# Patient Record
Sex: MALE | Race: WHITE | URBAN - METROPOLITAN AREA
[De-identification: names, ages, dates, MRNs, and addresses within clinical notes are randomized per-mention and may not be internally consistent; named-entity substitution may affect disease eponyms.]

---

## 2021-06-14 ENCOUNTER — APPOINTMENT (OUTPATIENT)
Dept: RADIOLOGY | Facility: MEDICAL CENTER | Age: 81
DRG: 871 | End: 2021-06-14
Attending: STUDENT IN AN ORGANIZED HEALTH CARE EDUCATION/TRAINING PROGRAM
Payer: MEDICARE

## 2021-06-14 ENCOUNTER — APPOINTMENT (OUTPATIENT)
Dept: RADIOLOGY | Facility: MEDICAL CENTER | Age: 81
DRG: 871 | End: 2021-06-14
Attending: EMERGENCY MEDICINE
Payer: MEDICARE

## 2021-06-14 ENCOUNTER — HOSPITAL ENCOUNTER (INPATIENT)
Facility: MEDICAL CENTER | Age: 81
LOS: 8 days | DRG: 871 | End: 2021-06-22
Attending: EMERGENCY MEDICINE | Admitting: STUDENT IN AN ORGANIZED HEALTH CARE EDUCATION/TRAINING PROGRAM
Payer: MEDICARE

## 2021-06-14 ENCOUNTER — HOSPITAL ENCOUNTER (OUTPATIENT)
Dept: RADIOLOGY | Facility: MEDICAL CENTER | Age: 81
End: 2021-06-14
Payer: MEDICARE

## 2021-06-14 DIAGNOSIS — N20.0 KIDNEY STONE: ICD-10-CM

## 2021-06-14 DIAGNOSIS — A41.9 SEPSIS WITH ACUTE ORGAN DYSFUNCTION, DUE TO UNSPECIFIED ORGANISM, UNSPECIFIED TYPE, UNSPECIFIED WHETHER SEPTIC SHOCK PRESENT: ICD-10-CM

## 2021-06-14 DIAGNOSIS — R65.20 SEPSIS WITH ACUTE ORGAN DYSFUNCTION, DUE TO UNSPECIFIED ORGANISM, UNSPECIFIED TYPE, UNSPECIFIED WHETHER SEPTIC SHOCK PRESENT: ICD-10-CM

## 2021-06-14 PROBLEM — N12 PYELONEPHRITIS: Status: ACTIVE | Noted: 2021-06-14

## 2021-06-14 PROBLEM — Z66 DNR (DO NOT RESUSCITATE): Status: ACTIVE | Noted: 2021-06-14

## 2021-06-14 PROBLEM — E87.20 LACTIC ACIDOSIS: Status: ACTIVE | Noted: 2021-06-14

## 2021-06-14 PROBLEM — N17.9 ACUTE RENAL FAILURE (ARF) (HCC): Status: ACTIVE | Noted: 2021-06-14

## 2021-06-14 LAB
ALBUMIN SERPL BCP-MCNC: 2.8 G/DL (ref 3.2–4.9)
ALBUMIN/GLOB SERPL: 1 G/DL
ALP SERPL-CCNC: 68 U/L (ref 30–99)
ALT SERPL-CCNC: 28 U/L (ref 2–50)
ANION GAP SERPL CALC-SCNC: 14 MMOL/L (ref 7–16)
APPEARANCE UR: CLEAR
AST SERPL-CCNC: 43 U/L (ref 12–45)
BACTERIA #/AREA URNS HPF: NEGATIVE /HPF
BASOPHILS # BLD AUTO: 0 % (ref 0–1.8)
BASOPHILS # BLD: 0 K/UL (ref 0–0.12)
BILIRUB SERPL-MCNC: 0.4 MG/DL (ref 0.1–1.5)
BILIRUB UR QL STRIP.AUTO: NEGATIVE
BUN SERPL-MCNC: 39 MG/DL (ref 8–22)
CALCIUM SERPL-MCNC: 8.2 MG/DL (ref 8.5–10.5)
CHLORIDE SERPL-SCNC: 106 MMOL/L (ref 96–112)
CHLORIDE UR-SCNC: 88 MMOL/L
CO2 SERPL-SCNC: 17 MMOL/L (ref 20–33)
COLOR UR: YELLOW
CREAT SERPL-MCNC: 2.45 MG/DL (ref 0.5–1.4)
CREAT UR-MCNC: 72.42 MG/DL
EKG IMPRESSION: NORMAL
EOSINOPHIL # BLD AUTO: 0 K/UL (ref 0–0.51)
EOSINOPHIL NFR BLD: 0 % (ref 0–6.9)
EPI CELLS #/AREA URNS HPF: NEGATIVE /HPF
ERYTHROCYTE [DISTWIDTH] IN BLOOD BY AUTOMATED COUNT: 44.3 FL (ref 35.9–50)
GLOBULIN SER CALC-MCNC: 2.7 G/DL (ref 1.9–3.5)
GLUCOSE SERPL-MCNC: 123 MG/DL (ref 65–99)
GLUCOSE UR STRIP.AUTO-MCNC: NEGATIVE MG/DL
GRAM STN SPEC: NORMAL
HCT VFR BLD AUTO: 37.4 % (ref 42–52)
HGB BLD-MCNC: 12.6 G/DL (ref 14–18)
HYALINE CASTS #/AREA URNS LPF: ABNORMAL /LPF
INR PPP: 1.45 (ref 0.87–1.13)
KETONES UR STRIP.AUTO-MCNC: NEGATIVE MG/DL
LACTATE BLD-SCNC: 1.8 MMOL/L (ref 0.5–2)
LACTATE BLD-SCNC: 3.2 MMOL/L (ref 0.5–2)
LACTATE BLD-SCNC: 4 MMOL/L (ref 0.5–2)
LACTATE BLD-SCNC: 4.3 MMOL/L (ref 0.5–2)
LACTATE BLD-SCNC: 4.3 MMOL/L (ref 0.5–2)
LACTATE BLD-SCNC: 4.5 MMOL/L (ref 0.5–2)
LEUKOCYTE ESTERASE UR QL STRIP.AUTO: ABNORMAL
LYMPHOCYTES # BLD AUTO: 0.15 K/UL (ref 1–4.8)
LYMPHOCYTES NFR BLD: 0.9 % (ref 22–41)
MAGNESIUM SERPL-MCNC: 1.6 MG/DL (ref 1.5–2.5)
MANUAL DIFF BLD: ABNORMAL
MCH RBC QN AUTO: 32.8 PG (ref 27–33)
MCHC RBC AUTO-ENTMCNC: 33.7 G/DL (ref 33.7–35.3)
MCV RBC AUTO: 97.4 FL (ref 81.4–97.8)
MICRO URNS: ABNORMAL
MONOCYTES # BLD AUTO: 0.29 K/UL (ref 0–0.85)
MONOCYTES NFR BLD AUTO: 1.7 % (ref 0–13.4)
MORPHOLOGY BLD-IMP: NORMAL
NEUTROPHILS # BLD AUTO: 16.66 K/UL (ref 1.82–7.42)
NEUTROPHILS NFR BLD: 74.8 % (ref 44–72)
NEUTS BAND NFR BLD MANUAL: 22.6 % (ref 0–10)
NITRITE UR QL STRIP.AUTO: POSITIVE
NRBC # BLD AUTO: 0 K/UL
NRBC BLD-RTO: 0 /100 WBC
NT-PROBNP SERPL IA-MCNC: 1676 PG/ML (ref 0–125)
PH UR STRIP.AUTO: 7.5 [PH] (ref 5–8)
PHOSPHATE SERPL-MCNC: 0.6 MG/DL (ref 2.5–4.5)
PLATELET # BLD AUTO: 179 K/UL (ref 164–446)
PLATELET BLD QL SMEAR: NORMAL
PMV BLD AUTO: 10.7 FL (ref 9–12.9)
POTASSIUM SERPL-SCNC: 3.7 MMOL/L (ref 3.6–5.5)
POTASSIUM UR-SCNC: 41 MMOL/L
PROCALCITONIN SERPL-MCNC: 135.07 NG/ML
PROT SERPL-MCNC: 5.5 G/DL (ref 6–8.2)
PROT UR QL STRIP: 30 MG/DL
PROT UR-MCNC: 44 MG/DL (ref 0–15)
PROTHROMBIN TIME: 17.2 SEC (ref 12–14.6)
RBC # BLD AUTO: 3.84 M/UL (ref 4.7–6.1)
RBC # URNS HPF: ABNORMAL /HPF
RBC BLD AUTO: NORMAL
RBC UR QL AUTO: ABNORMAL
RENAL EPI CELLS #/AREA URNS HPF: ABNORMAL /HPF
SARS-COV+SARS-COV-2 AG RESP QL IA.RAPID: NOTDETECTED
SARS-COV-2 RNA RESP QL NAA+PROBE: NOTDETECTED
SIGNIFICANT IND 70042: NORMAL
SITE SITE: NORMAL
SODIUM SERPL-SCNC: 137 MMOL/L (ref 135–145)
SODIUM UR-SCNC: 83 MMOL/L
SOURCE SOURCE: NORMAL
SP GR UR STRIP.AUTO: 1.01
SPECIMEN SOURCE: NORMAL
SPECIMEN SOURCE: NORMAL
UROBILINOGEN UR STRIP.AUTO-MCNC: 0.2 MG/DL
WBC # BLD AUTO: 17.1 K/UL (ref 4.8–10.8)
WBC #/AREA URNS HPF: ABNORMAL /HPF

## 2021-06-14 PROCEDURE — 700105 HCHG RX REV CODE 258: Performed by: EMERGENCY MEDICINE

## 2021-06-14 PROCEDURE — 99291 CRITICAL CARE FIRST HOUR: CPT | Performed by: STUDENT IN AN ORGANIZED HEALTH CARE EDUCATION/TRAINING PROGRAM

## 2021-06-14 PROCEDURE — 700111 HCHG RX REV CODE 636 W/ 250 OVERRIDE (IP): Performed by: EMERGENCY MEDICINE

## 2021-06-14 PROCEDURE — 87426 SARSCOV CORONAVIRUS AG IA: CPT

## 2021-06-14 PROCEDURE — 82570 ASSAY OF URINE CREATININE: CPT

## 2021-06-14 PROCEDURE — 99285 EMERGENCY DEPT VISIT HI MDM: CPT

## 2021-06-14 PROCEDURE — 87077 CULTURE AEROBIC IDENTIFY: CPT | Mod: 91

## 2021-06-14 PROCEDURE — 96374 THER/PROPH/DIAG INJ IV PUSH: CPT

## 2021-06-14 PROCEDURE — 76775 US EXAM ABDO BACK WALL LIM: CPT

## 2021-06-14 PROCEDURE — U0005 INFEC AGEN DETEC AMPLI PROBE: HCPCS

## 2021-06-14 PROCEDURE — 700102 HCHG RX REV CODE 250 W/ 637 OVERRIDE(OP): Performed by: STUDENT IN AN ORGANIZED HEALTH CARE EDUCATION/TRAINING PROGRAM

## 2021-06-14 PROCEDURE — 700111 HCHG RX REV CODE 636 W/ 250 OVERRIDE (IP): Performed by: STUDENT IN AN ORGANIZED HEALTH CARE EDUCATION/TRAINING PROGRAM

## 2021-06-14 PROCEDURE — 700105 HCHG RX REV CODE 258: Performed by: STUDENT IN AN ORGANIZED HEALTH CARE EDUCATION/TRAINING PROGRAM

## 2021-06-14 PROCEDURE — A9270 NON-COVERED ITEM OR SERVICE: HCPCS | Performed by: STUDENT IN AN ORGANIZED HEALTH CARE EDUCATION/TRAINING PROGRAM

## 2021-06-14 PROCEDURE — 87150 DNA/RNA AMPLIFIED PROBE: CPT | Mod: 91

## 2021-06-14 PROCEDURE — 36415 COLL VENOUS BLD VENIPUNCTURE: CPT

## 2021-06-14 PROCEDURE — C1729 CATH, DRAINAGE: HCPCS

## 2021-06-14 PROCEDURE — 83735 ASSAY OF MAGNESIUM: CPT

## 2021-06-14 PROCEDURE — 87086 URINE CULTURE/COLONY COUNT: CPT

## 2021-06-14 PROCEDURE — 81001 URINALYSIS AUTO W/SCOPE: CPT

## 2021-06-14 PROCEDURE — 83880 ASSAY OF NATRIURETIC PEPTIDE: CPT

## 2021-06-14 PROCEDURE — 97161 PT EVAL LOW COMPLEX 20 MIN: CPT

## 2021-06-14 PROCEDURE — 93005 ELECTROCARDIOGRAM TRACING: CPT | Performed by: STUDENT IN AN ORGANIZED HEALTH CARE EDUCATION/TRAINING PROGRAM

## 2021-06-14 PROCEDURE — C9803 HOPD COVID-19 SPEC COLLECT: HCPCS | Performed by: EMERGENCY MEDICINE

## 2021-06-14 PROCEDURE — 700111 HCHG RX REV CODE 636 W/ 250 OVERRIDE (IP)

## 2021-06-14 PROCEDURE — 770020 HCHG ROOM/CARE - TELE (206)

## 2021-06-14 PROCEDURE — 87186 SC STD MICRODIL/AGAR DIL: CPT | Mod: 91

## 2021-06-14 PROCEDURE — 99153 MOD SED SAME PHYS/QHP EA: CPT

## 2021-06-14 PROCEDURE — 84145 PROCALCITONIN (PCT): CPT

## 2021-06-14 PROCEDURE — 84156 ASSAY OF PROTEIN URINE: CPT

## 2021-06-14 PROCEDURE — 700117 HCHG RX CONTRAST REV CODE 255: Performed by: EMERGENCY MEDICINE

## 2021-06-14 PROCEDURE — 84133 ASSAY OF URINE POTASSIUM: CPT

## 2021-06-14 PROCEDURE — 93010 ELECTROCARDIOGRAM REPORT: CPT | Performed by: INTERNAL MEDICINE

## 2021-06-14 PROCEDURE — U0003 INFECTIOUS AGENT DETECTION BY NUCLEIC ACID (DNA OR RNA); SEVERE ACUTE RESPIRATORY SYNDROME CORONAVIRUS 2 (SARS-COV-2) (CORONAVIRUS DISEASE [COVID-19]), AMPLIFIED PROBE TECHNIQUE, MAKING USE OF HIGH THROUGHPUT TECHNOLOGIES AS DESCRIBED BY CMS-2020-01-R: HCPCS

## 2021-06-14 PROCEDURE — 83605 ASSAY OF LACTIC ACID: CPT | Mod: 91

## 2021-06-14 PROCEDURE — 82436 ASSAY OF URINE CHLORIDE: CPT

## 2021-06-14 PROCEDURE — 85027 COMPLETE CBC AUTOMATED: CPT

## 2021-06-14 PROCEDURE — BT111ZZ FLUOROSCOPY OF RIGHT KIDNEY USING LOW OSMOLAR CONTRAST: ICD-10-PCS | Performed by: RADIOLOGY

## 2021-06-14 PROCEDURE — 71045 X-RAY EXAM CHEST 1 VIEW: CPT

## 2021-06-14 PROCEDURE — 87070 CULTURE OTHR SPECIMN AEROBIC: CPT

## 2021-06-14 PROCEDURE — 96375 TX/PRO/DX INJ NEW DRUG ADDON: CPT

## 2021-06-14 PROCEDURE — 80053 COMPREHEN METABOLIC PANEL: CPT

## 2021-06-14 PROCEDURE — 84300 ASSAY OF URINE SODIUM: CPT

## 2021-06-14 PROCEDURE — 87205 SMEAR GRAM STAIN: CPT

## 2021-06-14 PROCEDURE — 84100 ASSAY OF PHOSPHORUS: CPT

## 2021-06-14 PROCEDURE — 87040 BLOOD CULTURE FOR BACTERIA: CPT

## 2021-06-14 PROCEDURE — 85007 BL SMEAR W/DIFF WBC COUNT: CPT

## 2021-06-14 PROCEDURE — 85610 PROTHROMBIN TIME: CPT

## 2021-06-14 PROCEDURE — 0T9330Z DRAINAGE OF RIGHT KIDNEY PELVIS WITH DRAINAGE DEVICE, PERCUTANEOUS APPROACH: ICD-10-PCS | Performed by: RADIOLOGY

## 2021-06-14 RX ORDER — AMOXICILLIN 250 MG
2 CAPSULE ORAL 2 TIMES DAILY
Status: DISCONTINUED | OUTPATIENT
Start: 2021-06-14 | End: 2021-06-22 | Stop reason: HOSPADM

## 2021-06-14 RX ORDER — ONDANSETRON 2 MG/ML
4 INJECTION INTRAMUSCULAR; INTRAVENOUS PRN
Status: ACTIVE | OUTPATIENT
Start: 2021-06-14 | End: 2021-06-14

## 2021-06-14 RX ORDER — MIDAZOLAM HYDROCHLORIDE 1 MG/ML
INJECTION INTRAMUSCULAR; INTRAVENOUS
Status: COMPLETED
Start: 2021-06-14 | End: 2021-06-14

## 2021-06-14 RX ORDER — AZITHROMYCIN 250 MG/1
500 TABLET, FILM COATED ORAL DAILY
Status: DISCONTINUED | OUTPATIENT
Start: 2021-06-14 | End: 2021-06-14

## 2021-06-14 RX ORDER — SODIUM CHLORIDE, SODIUM LACTATE, POTASSIUM CHLORIDE, AND CALCIUM CHLORIDE .6; .31; .03; .02 G/100ML; G/100ML; G/100ML; G/100ML
500 INJECTION, SOLUTION INTRAVENOUS
Status: DISCONTINUED | OUTPATIENT
Start: 2021-06-14 | End: 2021-06-22 | Stop reason: HOSPADM

## 2021-06-14 RX ORDER — MAGNESIUM SULFATE HEPTAHYDRATE 40 MG/ML
2 INJECTION, SOLUTION INTRAVENOUS ONCE
Status: COMPLETED | OUTPATIENT
Start: 2021-06-14 | End: 2021-06-14

## 2021-06-14 RX ORDER — MORPHINE SULFATE 4 MG/ML
4 INJECTION, SOLUTION INTRAMUSCULAR; INTRAVENOUS EVERY 4 HOURS PRN
Status: DISCONTINUED | OUTPATIENT
Start: 2021-06-14 | End: 2021-06-14

## 2021-06-14 RX ORDER — SODIUM CHLORIDE 9 MG/ML
500 INJECTION, SOLUTION INTRAVENOUS
Status: ACTIVE | OUTPATIENT
Start: 2021-06-14 | End: 2021-06-14

## 2021-06-14 RX ORDER — SODIUM CHLORIDE, SODIUM LACTATE, POTASSIUM CHLORIDE, CALCIUM CHLORIDE 600; 310; 30; 20 MG/100ML; MG/100ML; MG/100ML; MG/100ML
INJECTION, SOLUTION INTRAVENOUS CONTINUOUS
Status: DISCONTINUED | OUTPATIENT
Start: 2021-06-14 | End: 2021-06-14

## 2021-06-14 RX ORDER — ACETAMINOPHEN 325 MG/1
650 TABLET ORAL EVERY 6 HOURS PRN
Status: DISCONTINUED | OUTPATIENT
Start: 2021-06-14 | End: 2021-06-14

## 2021-06-14 RX ORDER — ENALAPRILAT 1.25 MG/ML
1.25 INJECTION INTRAVENOUS EVERY 6 HOURS PRN
Status: DISCONTINUED | OUTPATIENT
Start: 2021-06-14 | End: 2021-06-22 | Stop reason: HOSPADM

## 2021-06-14 RX ORDER — AZITHROMYCIN 500 MG/5ML
500 INJECTION, POWDER, LYOPHILIZED, FOR SOLUTION INTRAVENOUS EVERY 24 HOURS
Status: DISCONTINUED | OUTPATIENT
Start: 2021-06-14 | End: 2021-06-14

## 2021-06-14 RX ORDER — SODIUM CHLORIDE, SODIUM LACTATE, POTASSIUM CHLORIDE, AND CALCIUM CHLORIDE .6; .31; .03; .02 G/100ML; G/100ML; G/100ML; G/100ML
1000 INJECTION, SOLUTION INTRAVENOUS ONCE
Status: COMPLETED | OUTPATIENT
Start: 2021-06-14 | End: 2021-06-14

## 2021-06-14 RX ORDER — BISACODYL 10 MG
10 SUPPOSITORY, RECTAL RECTAL
Status: DISCONTINUED | OUTPATIENT
Start: 2021-06-14 | End: 2021-06-22 | Stop reason: HOSPADM

## 2021-06-14 RX ORDER — MIDODRINE HYDROCHLORIDE 5 MG/1
5 TABLET ORAL
Status: DISCONTINUED | OUTPATIENT
Start: 2021-06-14 | End: 2021-06-18

## 2021-06-14 RX ORDER — ONDANSETRON 4 MG/1
4 TABLET, ORALLY DISINTEGRATING ORAL EVERY 4 HOURS PRN
Status: DISCONTINUED | OUTPATIENT
Start: 2021-06-14 | End: 2021-06-22 | Stop reason: HOSPADM

## 2021-06-14 RX ORDER — POTASSIUM CHLORIDE 20 MEQ/1
40 TABLET, EXTENDED RELEASE ORAL ONCE
Status: COMPLETED | OUTPATIENT
Start: 2021-06-14 | End: 2021-06-14

## 2021-06-14 RX ORDER — LABETALOL HYDROCHLORIDE 5 MG/ML
10 INJECTION, SOLUTION INTRAVENOUS EVERY 4 HOURS PRN
Status: DISCONTINUED | OUTPATIENT
Start: 2021-06-14 | End: 2021-06-22 | Stop reason: HOSPADM

## 2021-06-14 RX ORDER — POLYETHYLENE GLYCOL 3350 17 G/17G
1 POWDER, FOR SOLUTION ORAL
Status: DISCONTINUED | OUTPATIENT
Start: 2021-06-14 | End: 2021-06-22 | Stop reason: HOSPADM

## 2021-06-14 RX ORDER — ACETAMINOPHEN 500 MG
1000 TABLET ORAL 3 TIMES DAILY
Status: DISCONTINUED | OUTPATIENT
Start: 2021-06-14 | End: 2021-06-22 | Stop reason: HOSPADM

## 2021-06-14 RX ORDER — SODIUM CHLORIDE, SODIUM LACTATE, POTASSIUM CHLORIDE, AND CALCIUM CHLORIDE .6; .31; .03; .02 G/100ML; G/100ML; G/100ML; G/100ML
30 INJECTION, SOLUTION INTRAVENOUS ONCE
Status: COMPLETED | OUTPATIENT
Start: 2021-06-14 | End: 2021-06-14

## 2021-06-14 RX ORDER — SODIUM CHLORIDE, SODIUM LACTATE, POTASSIUM CHLORIDE, CALCIUM CHLORIDE 600; 310; 30; 20 MG/100ML; MG/100ML; MG/100ML; MG/100ML
INJECTION, SOLUTION INTRAVENOUS CONTINUOUS
Status: DISCONTINUED | OUTPATIENT
Start: 2021-06-14 | End: 2021-06-22 | Stop reason: HOSPADM

## 2021-06-14 RX ORDER — MIDAZOLAM HYDROCHLORIDE 1 MG/ML
.5-2 INJECTION INTRAMUSCULAR; INTRAVENOUS PRN
Status: ACTIVE | OUTPATIENT
Start: 2021-06-14 | End: 2021-06-14

## 2021-06-14 RX ORDER — ONDANSETRON 2 MG/ML
4 INJECTION INTRAMUSCULAR; INTRAVENOUS EVERY 4 HOURS PRN
Status: DISCONTINUED | OUTPATIENT
Start: 2021-06-14 | End: 2021-06-22 | Stop reason: HOSPADM

## 2021-06-14 RX ADMIN — MIDAZOLAM HYDROCHLORIDE 0.5 MG: 1 INJECTION INTRAMUSCULAR; INTRAVENOUS at 05:32

## 2021-06-14 RX ADMIN — SODIUM CHLORIDE, POTASSIUM CHLORIDE, SODIUM LACTATE AND CALCIUM CHLORIDE 1000 ML: 600; 310; 30; 20 INJECTION, SOLUTION INTRAVENOUS at 03:56

## 2021-06-14 RX ADMIN — POTASSIUM CHLORIDE 40 MEQ: 1500 TABLET, EXTENDED RELEASE ORAL at 06:23

## 2021-06-14 RX ADMIN — MIDODRINE HYDROCHLORIDE 5 MG: 5 TABLET ORAL at 17:42

## 2021-06-14 RX ADMIN — SODIUM CHLORIDE, POTASSIUM CHLORIDE, SODIUM LACTATE AND CALCIUM CHLORIDE 1000 ML: 600; 310; 30; 20 INJECTION, SOLUTION INTRAVENOUS at 04:54

## 2021-06-14 RX ADMIN — MAGNESIUM SULFATE 2 G: 2 INJECTION INTRAVENOUS at 06:23

## 2021-06-14 RX ADMIN — IOHEXOL 15 ML: 300 INJECTION, SOLUTION INTRAVENOUS at 05:53

## 2021-06-14 RX ADMIN — MIDODRINE HYDROCHLORIDE 5 MG: 5 TABLET ORAL at 11:33

## 2021-06-14 RX ADMIN — SODIUM CHLORIDE, POTASSIUM CHLORIDE, SODIUM LACTATE AND CALCIUM CHLORIDE: 600; 310; 30; 20 INJECTION, SOLUTION INTRAVENOUS at 06:15

## 2021-06-14 RX ADMIN — CEFTRIAXONE SODIUM 1 G: 10 INJECTION, POWDER, FOR SOLUTION INTRAVENOUS at 03:56

## 2021-06-14 RX ADMIN — SODIUM CHLORIDE, POTASSIUM CHLORIDE, SODIUM LACTATE AND CALCIUM CHLORIDE 1000 ML: 600; 310; 30; 20 INJECTION, SOLUTION INTRAVENOUS at 11:32

## 2021-06-14 RX ADMIN — SODIUM CHLORIDE, POTASSIUM CHLORIDE, SODIUM LACTATE AND CALCIUM CHLORIDE: 600; 310; 30; 20 INJECTION, SOLUTION INTRAVENOUS at 15:43

## 2021-06-14 RX ADMIN — MIDAZOLAM HYDROCHLORIDE 0.5 MG: 1 INJECTION, SOLUTION INTRAMUSCULAR; INTRAVENOUS at 05:32

## 2021-06-14 RX ADMIN — AZITHROMYCIN MONOHYDRATE 500 MG: 250 TABLET ORAL at 09:35

## 2021-06-14 ASSESSMENT — ENCOUNTER SYMPTOMS
CONSTIPATION: 0
EYES NEGATIVE: 1
FALLS: 0
INSOMNIA: 0
SHORTNESS OF BREATH: 0
EYE PAIN: 0
COUGH: 0
ABDOMINAL PAIN: 0
GASTROINTESTINAL NEGATIVE: 1
LOSS OF CONSCIOUSNESS: 0
FEVER: 0
BLURRED VISION: 0
WEAKNESS: 1
RESPIRATORY NEGATIVE: 1
CARDIOVASCULAR NEGATIVE: 1
CHILLS: 1
DIARRHEA: 0
NAUSEA: 1
SENSORY CHANGE: 0
PALPITATIONS: 0
VOMITING: 0
HEADACHES: 0
DIZZINESS: 1
FOCAL WEAKNESS: 0
FEVER: 1
BACK PAIN: 1
DIZZINESS: 0
PSYCHIATRIC NEGATIVE: 1
FLANK PAIN: 1

## 2021-06-14 ASSESSMENT — PATIENT HEALTH QUESTIONNAIRE - PHQ9
SUM OF ALL RESPONSES TO PHQ9 QUESTIONS 1 AND 2: 0
2. FEELING DOWN, DEPRESSED, IRRITABLE, OR HOPELESS: NOT AT ALL
1. LITTLE INTEREST OR PLEASURE IN DOING THINGS: NOT AT ALL

## 2021-06-14 ASSESSMENT — COGNITIVE AND FUNCTIONAL STATUS - GENERAL
MOVING FROM LYING ON BACK TO SITTING ON SIDE OF FLAT BED: A LITTLE
MOBILITY SCORE: 22
SUGGESTED CMS G CODE MODIFIER MOBILITY: CJ
MOBILITY SCORE: 21
SUGGESTED CMS G CODE MODIFIER MOBILITY: CJ
TURNING FROM BACK TO SIDE WHILE IN FLAT BAD: A LITTLE
TURNING FROM BACK TO SIDE WHILE IN FLAT BAD: A LITTLE
MOVING TO AND FROM BED TO CHAIR: A LITTLE
SUGGESTED CMS G CODE MODIFIER DAILY ACTIVITY: CH
DAILY ACTIVITIY SCORE: 24
CLIMB 3 TO 5 STEPS WITH RAILING: A LITTLE

## 2021-06-14 ASSESSMENT — GAIT ASSESSMENTS
ASSISTIVE DEVICE: FRONT WHEEL WALKER
DISTANCE (FEET): 75
GAIT LEVEL OF ASSIST: SUPERVISED

## 2021-06-14 ASSESSMENT — LIFESTYLE VARIABLES
TOTAL SCORE: 0
SUBSTANCE_ABUSE: 0
HAVE YOU EVER FELT YOU SHOULD CUT DOWN ON YOUR DRINKING: NO
TOTAL SCORE: 0
EVER FELT BAD OR GUILTY ABOUT YOUR DRINKING: NO
EVER HAD A DRINK FIRST THING IN THE MORNING TO STEADY YOUR NERVES TO GET RID OF A HANGOVER: NO
ALCOHOL_USE: NO
CONSUMPTION TOTAL: INCOMPLETE
HAVE PEOPLE ANNOYED YOU BY CRITICIZING YOUR DRINKING: NO
TOTAL SCORE: 0

## 2021-06-14 ASSESSMENT — FIBROSIS 4 INDEX
FIB4 SCORE: 3.68
FIB4 SCORE: 3.68

## 2021-06-14 NOTE — CARE PLAN
The patient is Watcher - Medium risk of patient condition declining or worsening         Progress made toward(s) clinical / shift goals:  monitoring labs and vs     Patient is not progressing towards the following goals:

## 2021-06-14 NOTE — PROGRESS NOTES
Received phone call from [facility] regarding a positive results on blood cultures. Will fax it to [facility] as soon as it comes in.  Bedside RN notified.

## 2021-06-14 NOTE — CONSULTS
UROLOGY Consult Note:    [facility]  Date & Time note created:    6/14/2021   12:16 PM     Referring MD:  Dr. Gary Orellana    Patient ID:   Name:             Alex Odell     YOB: 1940  Age:                 81 y.o.  male   MRN:               [id number]                                                             Reason for Consult:      Infected stone    History of Present Illness:    81 y.o. male who presents to the Emergency Department transferred from outside hospital with infected ureteral stone. Patient had been having some increasing right-sided flank pain over the last couple days.  He also reports that he is had intermittent chills but denies fever.  Denies urinary frequency, urgency or hematuria.   At the outside hospital he was found to have nitrite positive esterase positive urine with multiple white blood cells he was also found to have a lactic acidosis at 3.8.  CT scan at outside hospital showed a 3 mm right-sided UVJ stone with some obstructive uropathy.    Review of Systems:      Constitutional: Denies fevers, Denies weight changes  Eyes: Denies changes in vision, no eye pain  Ears/Nose/Throat/Mouth: Denies nasal congestion or sore throat   Cardiovascular: no chest pain, no palpitations   Respiratory: no shortness of breath , Denies cough  Gastrointestinal/Hepatic: Positive for abdominal pain.  Denies nausea, vomiting, diarrhea, constipation or GI bleeding   Genitourinary: Denies hematuria, dysuria or frequency  Musculoskeletal/Rheum: Denies  joint pain and swelling, no edema  Skin: Denies rash  Neurological: Denies headache, confusion, memory loss or focal weakness/parasthesias  Psychiatric: denies mood disorder   Endocrine: Kaur thyroid problems  Heme/Oncology/Lymph Nodes: Denies enlarged lymph nodes, denies brusing or known bleeding disorder  All other systems were reviewed and are negative (AMA/CMS criteria)                Past Medical History:   No  past medical history on file.  Active Hospital Problems    Diagnosis    • Sepsis (HCC) [A41.9]    • Acute renal failure (ARF) (HCC) [N17.9]    • Pyelonephritis [N12]    • Lactic acidosis [E87.2]    • DNR (do not resuscitate) [Z66]        Past Surgical History:  Past Surgical History:   Procedure Laterality Date   • OTHER      90%prostate removed        Hospital Medications:    Current Facility-Administered Medications:   •  [START ON 6/15/2021] cefTRIAXone (Rocephin) syringe 1 g, 1 g, Intravenous, Q24HRS, Gary Orellana M.D.  •  lactated ringers infusion (BOLUS), 500 mL, Intravenous, Once PRN, Gary Orellana M.D.  •  senna-docusate (PERICOLACE or SENOKOT S) 8.6-50 MG per tablet 2 tablet, 2 tablet, Oral, BID **AND** polyethylene glycol/lytes (MIRALAX) PACKET 1 Packet, 1 Packet, Oral, QDAY PRN **AND** magnesium hydroxide (MILK OF MAGNESIA) suspension 30 mL, 30 mL, Oral, QDAY PRN **AND** bisacodyl (DULCOLAX) suppository 10 mg, 10 mg, Rectal, QDAY PRN, Gary Orellana M.D.  •  enalaprilat (VASOTEC) injection 1.25 mg, 1.25 mg, Intravenous, Q6HRS PRN, Gary Orellana M.D.  •  labetalol (NORMODYNE/TRANDATE) injection 10 mg, 10 mg, Intravenous, Q4HRS PRN, Gary Orellana M.D.  •  ondansetron (ZOFRAN) syringe/vial injection 4 mg, 4 mg, Intravenous, Q4HRS PRN, Gary Orellana M.D.  •  ondansetron (ZOFRAN ODT) dispertab 4 mg, 4 mg, Oral, Q4HRS PRN, Gary Orellana M.D.  •  FENTANYL CITRATE (PF) 0.05 MG/ML INJ SOLN (WRAPPED), , , ,   •  lactated ringers infusion, , Intravenous, Continuous, Gary Orellana M.D., Last Rate: 100 mL/hr at 06/14/21 0615, New Bag at 06/14/21 0615  •  acetaminophen (TYLENOL) tablet 1,000 mg, 1,000 mg, Oral, TID, Gary Orellana M.D.  •  midodrine (PROAMATINE) tablet 5 mg, 5 mg, Oral, TID WITH MEALS, Jimenez Muniz M.D., 5 mg at 06/14/21 1133    Current Outpatient Medications:  No medications prior to admission.  "      Medication Allergy:  No Known Allergies    Family History:  Family History   Problem Relation Age of Onset   • Cancer Mother        Social History:  Social History     Socioeconomic History   • Marital status: Unknown     Spouse name: Not on file   • Number of children: Not on file   • Years of education: Not on file   • Highest education level: Not on file   Occupational History   • Not on file   Tobacco Use   • Smoking status: Never Smoker   • Smokeless tobacco: Never Used   Vaping Use   • Vaping Use: Never used   Substance and Sexual Activity   • Alcohol use: Never   • Drug use: Never   • Sexual activity: Not on file   Other Topics Concern   • Not on file   Social History Narrative   • Not on file     Social Determinants of Health     Financial Resource Strain:    • Difficulty of Paying Living Expenses:    Food Insecurity:    • Worried About Running Out of Food in the Last Year:    • Ran Out of Food in the Last Year:    Transportation Needs:    • Lack of Transportation (Medical):    • Lack of Transportation (Non-Medical):    Physical Activity:    • Days of Exercise per Week:    • Minutes of Exercise per Session:    Stress:    • Feeling of Stress :    Social Connections:    • Frequency of Communication with Friends and Family:    • Frequency of Social Gatherings with Friends and Family:    • Attends [employer] Services:    • Active Member of Clubs or Organizations:    • Attends Club or Organization Meetings:    • Marital Status:    Intimate Partner Violence:    • Fear of Current or Ex-Partner:    • Emotionally Abused:    • Physically Abused:    • Sexually Abused:          Physical Exam:  Vitals/ General Appearance:   Weight/BMI: Body mass index is 20.92 kg/m².  BP (!) 87/56   Pulse 90   Temp 37.1 °C (98.8 °F) (Temporal)   Resp 17   Ht 1.803 m (5' 11\")   Wt 68 kg (150 lb)   SpO2 95%   Vitals:    06/14/21 0723 06/14/21 0727 06/14/21 0823 06/14/21 0922   BP:  (!) 93/56 (!) 87/56    Pulse: 90  90    Resp: " "17  17    Temp: 37.1 °C (98.8 °F)      TempSrc: Temporal      SpO2: 94%  95%    Weight:   68 kg (150 lb) 68 kg (150 lb)   Height:   1.803 m (5' 11\")      Oxygen Therapy:  Pulse Oximetry: 95 %, O2 (LPM): 2, O2 Delivery Device: Silicone Nasal Cannula    Constitutional:   Well developed, Well nourished, No acute distress  HENMT:  Normocephalic, Atraumatic  Eyes:  EOMI, Conjunctiva normal, No discharge.  Neck:  Normal range of motion  Lungs:  Good air entry, no labored breathing.   Abdomen: soft, no distention.  : RT NT in place draining tea colored urine.  Bashir in place   Skin: Warm, Dry, No erythema, No rash, no induration.  Neurologic: Alert & oriented x 3.  Psychiatric: Affect normal, Judgment normal, Mood normal.      MDM (Data Review):     Records reviewed and summarized in current documentation    Lab Data Review:  Recent Results (from the past 24 hour(s))   LACTIC ACID    Collection Time: 06/14/21  3:00 AM   Result Value Ref Range    Lactic Acid 4.3 (HH) 0.5 - 2.0 mmol/L   CBC WITH DIFFERENTIAL    Collection Time: 06/14/21  3:00 AM   Result Value Ref Range    WBC 17.1 (H) 4.8 - 10.8 K/uL    RBC 3.84 (L) 4.70 - 6.10 M/uL    Hemoglobin 12.6 (L) 14.0 - 18.0 g/dL    Hematocrit 37.4 (L) 42.0 - 52.0 %    MCV 97.4 81.4 - 97.8 fL    MCH 32.8 27.0 - 33.0 pg    MCHC 33.7 33.7 - 35.3 g/dL    RDW 44.3 35.9 - 50.0 fL    Platelet Count 179 164 - 446 K/uL    MPV 10.7 9.0 - 12.9 fL    Neutrophils-Polys 74.80 (H) 44.00 - 72.00 %    Lymphocytes 0.90 (L) 22.00 - 41.00 %    Monocytes 1.70 0.00 - 13.40 %    Eosinophils 0.00 0.00 - 6.90 %    Basophils 0.00 0.00 - 1.80 %    Nucleated RBC 0.00 /100 WBC    Neutrophils (Absolute) 16.66 (H) 1.82 - 7.42 K/uL    Lymphs (Absolute) 0.15 (L) 1.00 - 4.80 K/uL    Monos (Absolute) 0.29 0.00 - 0.85 K/uL    Eos (Absolute) 0.00 0.00 - 0.51 K/uL    Baso (Absolute) 0.00 0.00 - 0.12 K/uL    NRBC (Absolute) 0.00 K/uL   DIFFERENTIAL MANUAL    Collection Time: 06/14/21  3:00 AM   Result Value Ref Range "    Bands-Stabs 22.60 (H) 0.00 - 10.00 %    Manual Diff Status PERFORMED    PERIPHERAL SMEAR REVIEW    Collection Time: 06/14/21  3:00 AM   Result Value Ref Range    Peripheral Smear Review see below    PLATELET ESTIMATE    Collection Time: 06/14/21  3:00 AM   Result Value Ref Range    Plt Estimation Normal    MORPHOLOGY    Collection Time: 06/14/21  3:00 AM   Result Value Ref Range    RBC Morphology Normal    PHOSPHORUS    Collection Time: 06/14/21  3:00 AM   Result Value Ref Range    Phosphorus 0.6 (LL) 2.5 - 4.5 mg/dL   SARS-COV Antigen ARUN: Collect dry nasal swab AND NP swab in VTM    Collection Time: 06/14/21  4:00 AM   Result Value Ref Range    SARS-CoV-2 Source Nasal Swab     SARS-COV ANTIGEN ARUN NotDetected Not-Detected   SARS-CoV-2 PCR (24 hour In-House): Collect NP swab in VTM    Collection Time: 06/14/21  4:00 AM    Specimen: Respirate   Result Value Ref Range    SARS-CoV-2 Source NP Swab    LACTIC ACID    Collection Time: 06/14/21  4:45 AM   Result Value Ref Range    Lactic Acid 4.0 (HH) 0.5 - 2.0 mmol/L   URINALYSIS    Collection Time: 06/14/21  4:45 AM    Specimen: Urine   Result Value Ref Range    Color Yellow     Character Clear     Specific Gravity 1.013 <1.035    Ph 7.5 5.0 - 8.0    Glucose Negative Negative mg/dL    Ketones Negative Negative mg/dL    Protein 30 (A) Negative mg/dL    Bilirubin Negative Negative    Urobilinogen, Urine 0.2 Negative    Nitrite Positive (A) Negative    Leukocyte Esterase Trace (A) Negative    Occult Blood Moderate (A) Negative    Micro Urine Req Microscopic    Prothrombin Time    Collection Time: 06/14/21  4:45 AM   Result Value Ref Range    PT 17.2 (H) 12.0 - 14.6 sec    INR 1.45 (H) 0.87 - 1.13   proBrain Natriuretic Peptide, NT    Collection Time: 06/14/21  4:45 AM   Result Value Ref Range    NT-proBNP 1676 (H) 0 - 125 pg/mL   PROCALCITONIN    Collection Time: 06/14/21  4:45 AM   Result Value Ref Range    Procalcitonin 135.07 (H) <0.25 ng/mL   Comp Metabolic Panel     Collection Time: 06/14/21  4:45 AM   Result Value Ref Range    Sodium 137 135 - 145 mmol/L    Potassium 3.7 3.6 - 5.5 mmol/L    Chloride 106 96 - 112 mmol/L    Co2 17 (L) 20 - 33 mmol/L    Anion Gap 14.0 7.0 - 16.0    Glucose 123 (H) 65 - 99 mg/dL    Bun 39 (H) 8 - 22 mg/dL    Creatinine 2.45 (H) 0.50 - 1.40 mg/dL    Calcium 8.2 (L) 8.5 - 10.5 mg/dL    AST(SGOT) 43 12 - 45 U/L    ALT(SGPT) 28 2 - 50 U/L    Alkaline Phosphatase 68 30 - 99 U/L    Total Bilirubin 0.4 0.1 - 1.5 mg/dL    Albumin 2.8 (L) 3.2 - 4.9 g/dL    Total Protein 5.5 (L) 6.0 - 8.2 g/dL    Globulin 2.7 1.9 - 3.5 g/dL    A-G Ratio 1.0 g/dL   MAGNESIUM    Collection Time: 06/14/21  4:45 AM   Result Value Ref Range    Magnesium 1.6 1.5 - 2.5 mg/dL   URINE MICROSCOPIC (W/UA)    Collection Time: 06/14/21  4:45 AM   Result Value Ref Range    WBC 10-20 (A) /hpf    RBC 10-20 (A) /hpf    Bacteria Negative None /hpf    Epithelial Cells Negative /hpf    Epithelial Cells Renal Rare /hpf    Hyaline Cast 0-2 /lpf   ESTIMATED GFR    Collection Time: 06/14/21  4:45 AM   Result Value Ref Range    GFR If  31 (A) >60 mL/min/1.73 m 2    GFR If Non African American 25 (A) >60 mL/min/1.73 m 2   URINE SODIUM RANDOM    Collection Time: 06/14/21  6:27 AM   Result Value Ref Range    Sodium, Urine -per volume 83 mmol/L   URINE POTASSIUM RANDOM    Collection Time: 06/14/21  6:27 AM   Result Value Ref Range    Potassium 41.0 mmol/L   URINE CHLORIDE RANDOM    Collection Time: 06/14/21  6:27 AM   Result Value Ref Range    Chloride, Urine-per volume 88 mmol/L   URINE CREATININE RANDOM    Collection Time: 06/14/21  6:27 AM   Result Value Ref Range    Creatinine, Random Urine 72.42 mg/dL   URINE PROTEIN RANDOM    Collection Time: 06/14/21  6:27 AM   Result Value Ref Range    Total Protein, Urine 44.0 (H) 0.0 - 15.0 mg/dL   EKG    Collection Time: 06/14/21  6:45 AM   Result Value Ref Range    Report       [facility]    Test Date:  2021-06-14  Pt Name:     CHARANJIT SANDERS              Department: ER  MRN:        [id number]                      Room:       T827  Gender:     Male                         Technician: JOYA  :        [id number]                   Requested By:NI SUAZO  Order #:    [id number]                    Reading MD: Loy Ferrara MD    Measurements  Intervals                                Axis  Rate:       89                           P:          73  TN:         148                          QRS:        92  QRSD:       90                           T:          -10  QT:         384  QTc:        468    Interpretive Statements  SINUS RHYTHM  BORDERLINE RIGHT AXIS DEVIATION  BORDERLINE LOW VOLTAGE IN FRONTAL LEADS  BORDERLINE T ABNORMALITIES, INFERIOR LEADS  No previous ECG available for comparison  Electronically Signed On 2021 6:58:05 PDT by Loy Ferrara MD     LACTIC ACID    Collection Time: 21  8:22 AM   Result Value Ref Range    Lactic Acid 4.5 (HH) 0.5 - 2.0 mmol/L       Imaging/Procedures Review:    Reviewed    MDM (Assessment and Plan):     Active Hospital Problems    Diagnosis    • Sepsis (HCC) [A41.9]    • Acute renal failure (ARF) (HCC) [N17.9]    • Pyelonephritis [N12]    • Lactic acidosis [E87.2]    • DNR (do not resuscitate) [Z66]        PLAN:   - No emergent need for surgical intervention.  Continue NT and dailey for now and trend renal function.    - abx per culture results and hospital team  - Strain urine.  If patient has not passed the stone, he will need CULTS in 2-4 weeks once infection is appropriately treated.    - Urology will follow

## 2021-06-14 NOTE — ASSESSMENT & PLAN NOTE
Had an extensive conversation with patient regarding CODE STATUS which patient reports that he would like to be DNR/DNI.  Patient voiced understanding and all questions were answered.

## 2021-06-14 NOTE — THERAPY
"Physical Therapy   Initial Evaluation     Patient Name: Alex Odell  Age:  81 y.o., Sex:  male  Medical Record #: [id number]  Today's Date: 6/14/2021     Precautions: Fall Risk    Assessment  Patient is 81 y.o. male with a diagnosis of sepsis, s/p R nephrostomy tube placement.  Additional factors influencing patient status / progress : today, pt needs min A out of bed, supervised for transfers and supervised for ambulation x 75 feet using FWW, pt with c/o legs feeling \"wobbly\", but no buckling. Pt has a large, watery BM after walking to toilet. Pt lives with spouse who can assist as needed. PT to follow to trial longer walk without AD and stairs before home. .      Plan    Recommend Physical Therapy 3 times per week until therapy goals are met for the following treatments:  Bed Mobility, Gait Training, Neuro Re-Education / Balance, Stair Training and Therapeutic Activities    DC Equipment Recommendations: None  Discharge Recommendations: Anticipate that the patient will have no further physical therapy needs after discharge from the hospital          Objective       06/14/21 1636   Precautions   Precautions Fall Risk   Comments R nephrostomy tube.    Prior Living Situation   Prior Services None   Housing / Facility 2 [address]   Steps Into Home 3  (no rail)   Steps In Home   (flight of spiral stairs)   Rail Both Rail (Steps in Home)   Elevator No   Equipment Owned None   Lives with - Patient's Self Care Capacity Spouse  (home as needed, can help as needed)   Prior Level of Functional Mobility   Bed Mobility Independent   Transfer Status Independent   Ambulation Independent   Distance Ambulation (Feet)   (community, very active. )   Assistive Devices Used None   Stairs Independent   Gait Analysis   Gait Level Of Assist Supervised   Assistive Device Front Wheel Walker   Distance (Feet) 75   Deviation   (c/o wobbly legs, but no buckling)   Bed Mobility    Supine to Sit Minimal Assist   Sit to Supine Supervised "   Scooting Supervised   Rolling Supervised   Functional Mobility   Sit to Stand Supervised   Bed, Chair, Wheelchair Transfer Supervised   Toilet Transfers Supervised  (pt asks to  use toilet, large, watery BM, nsg in to check. )   Transfer Method Stand Pivot   Short Term Goals    Short Term Goal # 1 Pt will perform bed mobility with flat bed, no rail with mod indep in 6 visits.   Short Term Goal # 2 Pt will transfer with mod indep in 6 visits to improve functional indep.   Short Term Goal # 3 Pt will ambulate x 200 feet using no AD with supervision in 6 visits to improve functional indep.    Short Term Goal # 4 Pt will go up/down 1 flight stairs with supervision in 6 visits to access full home.    Anticipated Discharge Equipment and Recommendations   DC Equipment Recommendations None   Discharge Recommendations Anticipate that the patient will have no further physical therapy needs after discharge from the hospital

## 2021-06-14 NOTE — PROGRESS NOTES
Received report from RENNY Garces (IR). Patient is A&Ox4. Patient transported via [facility] with ACLS RN and Zoll monitor. Patient arrived to unit, placed on tele box. Confirmed SR with monitor room. Patient slid from [facility] to hospital bed, tolerated well. Pt oriented to unit and call light, verbalized understanding. Fall precautions in place. Call light and belongings within reach. Bed locked and in lowest position.

## 2021-06-14 NOTE — OR SURGEON
Immediate Post- Operative Note    PostOp Diagnosis: RENAL OBSTRUCTION. UROLITHIASIS. SEPSIS.      Procedure(s): RIGHT PERCUTANEOUS NEPHROSTOMY PLACEMENT AND NEPHROSTOGRAM WITH U/S AND FLUOROSCOPIC GUIDANCE    MODERATE RIGHT HYDRONEPHROSIS AND MILD HYDROURETER    8 ML SAMPLE URINE FROM R LOWER POLE CALYX INITIAL NEEDLE PUNCTURE SENT FOR C+S, GRAM, CELL COUNT    8F LOCKING LOOP TO GRAVITY BAG.       Estimated Blood Loss: <1 CC        Complications: NONE          6/14/2021     5:48 AM     Wing Carvalho M.D.

## 2021-06-14 NOTE — H&P
Hospital Medicine History & Physical Note    Date of Service  6/14/2021    Primary Care Physician  No primary care provider on file.    Consultants  Urology (Dr. Owens)  Interventional radiology (Dr. Carvalho)    Code Status  DNAR/DNI    Chief Complaint  Chief Complaint   Patient presents with   • Fever   • Flank Pain       History of Presenting Illness  81 y.o. male with a past medical history of BPH status post vasectomy years ago presented to the [facility] emergency department on 6/13/2021 with 1 day history of worsening right-sided back pain, fever and chills.  Patient stating that he has had pain similar to such a month ago that resolved on its own.  He does have a history of untreated UTIs.  Patient denies any urinary frequency, dysuria or straining on urination.  At the [facility] emergency department, Vital signs with tachycardia at 120, 39 °C fever, tachypnea in the 20s.  Patient requiring 4 L nasal cannula to maintain saturation.  CBC without leukocytosis or anemia.  Chemistry with acute renal failure creatinine 1.8 and BUN 36.  Lactic acidosis at 3.8. Blood samples were collected for culture.  He received a 2 L bolus and was a start Rocephin regimen antibiotic regimen.      Patient was transferred to [facility] for sepsis secondary to pyonephritis. At the emergency department, vital signs with tachycardia in the 110s, tachypnea in the 20s, and hypotension with map at 63.  Patient was started on sepsis bolus and blood samples were collected for lab.  EDP as well as myself discussed case with Urology (Dr. Owens), who requested  tube placement by interventional radiology.  I discussed case with interventional radiology (Dr. Carvalho), who will evaluate case for nephrostomy tube placement indication.  Repeat CBC with leukocytosis at 17.1 with elevated polys and normocytic anemia with hemoglobin 12.6.  Patient was admitted to telemetry for sepsis secondary to pyelonephritis which required IV antibiotics.    Review of  Systems  Review of Systems   Constitutional: Positive for chills, fever and malaise/fatigue.   HENT: Negative.    Eyes: Negative.    Respiratory: Negative.    Cardiovascular: Negative.    Gastrointestinal: Negative.    Genitourinary: Positive for flank pain. Negative for dysuria, frequency and urgency.   Skin: Negative.    Neurological: Positive for dizziness and weakness. Negative for loss of consciousness.   Endo/Heme/Allergies: Negative.    Psychiatric/Behavioral: Negative.      Past Medical History  Patient denies past medical history    Surgical History  History of partial vasectomy years ago    Family History  Reviewed and noncontributory    Social History  No smoking, alcohol use or recreational drug use    Allergies  No Known Allergies    Medications  None       Physical Exam  Temp:  [37.1 °C (98.7 °F)-37.5 °C (99.5 °F)] 37.1 °C (98.7 °F)  Pulse:  [] 91  Resp:  [20-29] 21  BP: (75-96)/(48-54) 82/49  SpO2:  [93 %-96 %] 96 %    Physical Exam  Constitutional:       General: He is not in acute distress.     Appearance: He is not ill-appearing, toxic-appearing or diaphoretic.   HENT:      Head: Normocephalic and atraumatic.      Nose: Nose normal. No congestion.      Mouth/Throat:      Mouth: Mucous membranes are dry.   Eyes:      Extraocular Movements: Extraocular movements intact.      Pupils: Pupils are equal, round, and reactive to light.   Cardiovascular:      Rate and Rhythm: Regular rhythm. Tachycardia present.      Pulses: Normal pulses.      Heart sounds: Normal heart sounds.   Pulmonary:      Effort: Pulmonary effort is normal.      Breath sounds: Normal breath sounds.   Abdominal:      General: Bowel sounds are normal.      Palpations: Abdomen is soft.      Tenderness: There is no abdominal tenderness.   Musculoskeletal:         General: No swelling. Normal range of motion.      Cervical back: Normal range of motion and neck supple.   Skin:     General: Skin is warm.      Coloration: Skin is not  jaundiced.   Neurological:      General: No focal deficit present.      Mental Status: He is oriented to person, place, and time. Mental status is at baseline.      Cranial Nerves: No cranial nerve deficit.   Psychiatric:         Mood and Affect: Mood normal.         Behavior: Behavior normal.         Thought Content: Thought content normal.         Judgment: Judgment normal.         Laboratory:  Recent Labs     06/14/21  0300   WBC 17.1*   RBC 3.84*   HEMOGLOBIN 12.6*   HEMATOCRIT 37.4*   MCV 97.4   MCH 32.8   MCHC 33.7   RDW 44.3   PLATELETCT 179   MPV 10.7     Recent Labs     06/14/21  0445   SODIUM 137   POTASSIUM 3.7   CHLORIDE 106   CO2 17*   GLUCOSE 123*   BUN 39*   CREATININE 2.45*   CALCIUM 8.2*     Recent Labs     06/14/21  0445   ALTSGPT 28   ASTSGOT 43   ALKPHOSPHAT 68   TBILIRUBIN 0.4   GLUCOSE 123*     Recent Labs     06/14/21  0445   INR 1.45*     Recent Labs     06/14/21  0445   NTPROBNP 1676*         No results for input(s): TROPONINT in the last 72 hours.    Imaging:  DX-CHEST-PORTABLE (1 VIEW)   Final Result         1.  No focal infiltrates.   2.  Perihilar interstitial prominence and bronchial wall cuffing, appearance suggests changes of underlying bronchial inflammation, consider bronchitis.      OUTSIDE IMAGES-CT ABDOMEN /PELVIS   Final Result      OUTSIDE IMAGES-DX CHEST   Final Result      IR-PERQ NEPH CATH NEW ACCESS (ALL RADIOLOGY) RIGHT    (Results Pending)   EC-ECHOCARDIOGRAM COMPLETE W/O CONT    (Results Pending)         Assessment/Plan:  I anticipate this patient will require at least two midnights for appropriate medical management, necessitating inpatient admission.    * Sepsis (HCC)- (present on admission)  Assessment & Plan  This is Severe Sepsis Present on admission  SIRS criteria identified on my evaluation include: Fever, with temperature greater than 101 deg F, Tachycardia, with heart rate greater than 90 BPM, Tachypnea, with respirations greater than 20 per minute and  Leukocytosis, with WBC greater than 12,000  Source is right-sided pyelonephritis  Clinical indicators of end organ dysfunction include Systolic blood pressure (SBP) <90 mmHg or mean arterial pressure <65 mmHg  Sepsis protocol initiated  Fluid resuscitation ordered per protocol  IV antibiotics as appropriate for source of sepsis  Reassessment: I have reassessed the patient's hemodynamic status  End organ dysfunction include(s):  Acute respiratory failure and Acute kidney failure     SIRS 4/4 and qSOFA 2/3  Secondary to right-sided pyelonephritis from renal stone noted at outside facility  Urinalysis at outside facility with nitrates and leukocyte esterase  Received 2 L of normal saline at outside facility, an additional liter was given at our emergency department  Started on Rocephin antibiotic regimen at outside facility  Lactic acid 3.8 and increasing to 4.3 at our ED  Continue IV hydration  Continue Rocephin antibiotic regimen  Case discussed with urology, recommending nephrostomy tube placement by IR  Discussed case with interventional radiology, plan patient for right nephrostomy placement today  Follow blood and urine cultures    Lactic acidosis- (present on admission)  Assessment & Plan  3.8, 4.3  Map in the 60s  Aggressive IV hydration  Trend lactic acid    Pyelonephritis- (present on admission)  Assessment & Plan  Right-sided and noted at outside facility CT scan  Secondary to 3 mm stone  IV hydration  Continue IV antibiotics    Acute renal failure (ARF) (HCC)- (present on admission)  Assessment & Plan  Creatinine 1.8 at outside facility  Pending repeat chemistry  Secondary to prerenal and postrenal etiologies (septic shock/nephrolithiasis)  Right pyelonephritis noted on outside facility abdominal/pelvic CT  Continue IV hydration for now  Avoid nephrotoxins  Renal dose meds  Trend creatinine    DVT prophylaxis SCDs

## 2021-06-14 NOTE — ASSESSMENT & PLAN NOTE
This is Severe Sepsis Present on admission  SIRS criteria identified on my evaluation include: Fever, with temperature greater than 101 deg F, Tachycardia, with heart rate greater than 90 BPM, Tachypnea, with respirations greater than 20 per minute and Leukocytosis, with WBC greater than 12,000  Source is right-sided pyelonephritis  Clinical indicators of end organ dysfunction include Systolic blood pressure (SBP) <90 mmHg or mean arterial pressure <65 mmHg  Sepsis protocol initiated  Fluid resuscitation ordered per protocol  IV antibiotics as appropriate for source of sepsis  Reassessment: I have reassessed the patient's hemodynamic status  End organ dysfunction include(s):  Acute respiratory failure and Acute kidney failure     SIRS 4/4 and qSOFA 2/3  Secondary to right-sided pyelonephritis from renal stone noted on outside facility CT  Continue IV hydration  Continue Rocephin antibiotic regimen  S/p R nephrostomy tube 6/14  Follow blood and urine cultures

## 2021-06-14 NOTE — H&P
Hospital Medicine History & Physical Note    Date of Service  6/14/2021    Primary Care Physician  No primary care provider on file.    Consultants  Urology (Dr. Owens)  Interventional radiology (Dr. Carvalho)    Code Status  DNAR/DNI    Chief Complaint  Chief Complaint   Patient presents with   • Fever   • Flank Pain       History of Presenting Illness  81 y.o. male with a past medical history of BPH status post vasectomy years ago presented to the [facility] emergency department on 6/13/2021 with 1 day history of worsening right-sided back pain, fever and chills.  Patient stating that he has had pain similar to such a month ago that resolved on its own.  He does have a history of untreated UTIs.  Patient denies any urinary frequency, dysuria or straining on urination.  At the [facility] emergency department, Vital signs with tachycardia at 120, 39 °C fever, tachypnea in the 20s.  Patient requiring 4 L nasal cannula to maintain saturation.  CBC without leukocytosis or anemia.  Chemistry with acute renal failure creatinine 1.8 and BUN 36.  Lactic acidosis at 3.8. Blood samples were collected for culture.  He received a 2 L bolus and was a start Rocephin regimen antibiotic regimen.      Patient was transferred to [facility] for sepsis secondary to pyonephritis. At the emergency department, vital signs with tachycardia in the 110s, tachypnea in the 20s, and hypotension with map at 63.  Patient was started on sepsis bolus and blood samples were collected for lab.  EDP as well as myself discussed case with Urology (Dr. Owens), who requested  tube placement by interventional radiology.  I discussed case with interventional radiology (Dr. Carvalho), who will evaluate case for nephrostomy tube placement indication.  Repeat CBC with leukocytosis at 17.1 with elevated polys and normocytic anemia with hemoglobin 12.6.  Patient was admitted to telemetry for sepsis secondary to pyelonephritis which required IV antibiotics.    Review of  Systems  Review of Systems   Constitutional: Positive for chills, fever and malaise/fatigue.   HENT: Negative.    Eyes: Negative.    Respiratory: Negative.    Cardiovascular: Negative.    Gastrointestinal: Negative.    Genitourinary: Positive for flank pain. Negative for dysuria, frequency and urgency.   Skin: Negative.    Neurological: Positive for dizziness and weakness. Negative for loss of consciousness.   Endo/Heme/Allergies: Negative.    Psychiatric/Behavioral: Negative.      Past Medical History  Patient denies past medical history    Surgical History  History of partial vasectomy years ago    Family History  Reviewed and noncontributory    Social History  No smoking, alcohol use or recreational drug use    Allergies  No Known Allergies    Medications  None       Physical Exam  Temp:  [37.5 °C (99.5 °F)] 37.5 °C (99.5 °F)  Pulse:  [] 92  Resp:  [20-29] 29  BP: (93)/(52) 93/52  SpO2:  [93 %-94 %] 94 %    Physical Exam  Constitutional:       General: He is not in acute distress.     Appearance: He is not ill-appearing, toxic-appearing or diaphoretic.   HENT:      Head: Normocephalic and atraumatic.      Nose: Nose normal. No congestion.      Mouth/Throat:      Mouth: Mucous membranes are dry.   Eyes:      Extraocular Movements: Extraocular movements intact.      Pupils: Pupils are equal, round, and reactive to light.   Cardiovascular:      Rate and Rhythm: Regular rhythm. Tachycardia present.      Pulses: Normal pulses.      Heart sounds: Normal heart sounds.   Pulmonary:      Effort: Pulmonary effort is normal.      Breath sounds: Normal breath sounds.   Abdominal:      General: Bowel sounds are normal.      Palpations: Abdomen is soft.      Tenderness: There is no abdominal tenderness.   Musculoskeletal:         General: No swelling. Normal range of motion.      Cervical back: Normal range of motion and neck supple.   Skin:     General: Skin is warm.      Coloration: Skin is not jaundiced.    Neurological:      General: No focal deficit present.      Mental Status: He is oriented to person, place, and time. Mental status is at baseline.      Cranial Nerves: No cranial nerve deficit.   Psychiatric:         Mood and Affect: Mood normal.         Behavior: Behavior normal.         Thought Content: Thought content normal.         Judgment: Judgment normal.         Laboratory:  Recent Labs     06/14/21  0300   WBC 17.1*   RBC 3.84*   HEMOGLOBIN 12.6*   HEMATOCRIT 37.4*   MCV 97.4   MCH 32.8   MCHC 33.7   RDW 44.3   PLATELETCT 179   MPV 10.7         No results for input(s): ALTSGPT, ASTSGOT, ALKPHOSPHAT, TBILIRUBIN, DBILIRUBIN, GAMMAGT, AMYLASE, LIPASE, ALB, PREALBUMIN, GLUCOSE in the last 72 hours.      No results for input(s): NTPROBNP in the last 72 hours.      No results for input(s): TROPONINT in the last 72 hours.    Imaging:  DX-CHEST-PORTABLE (1 VIEW)   Final Result         1.  No focal infiltrates.   2.  Perihilar interstitial prominence and bronchial wall cuffing, appearance suggests changes of underlying bronchial inflammation, consider bronchitis.      OUTSIDE IMAGES-CT ABDOMEN /PELVIS   Final Result      OUTSIDE IMAGES-DX CHEST   Final Result      IR-PERQ NEPH CATH NEW ACCESS (ALL RADIOLOGY) RIGHT    (Results Pending)         Assessment/Plan:  I anticipate this patient will require at least two midnights for appropriate medical management, necessitating inpatient admission.    * Sepsis (HCC)- (present on admission)  Assessment & Plan  This is Severe Sepsis Present on admission  SIRS criteria identified on my evaluation include: Fever, with temperature greater than 101 deg F, Tachycardia, with heart rate greater than 90 BPM, Tachypnea, with respirations greater than 20 per minute and Leukocytosis, with WBC greater than 12,000  Source is right-sided pyelonephritis  Clinical indicators of end organ dysfunction include Systolic blood pressure (SBP) <90 mmHg or mean arterial pressure <65 mmHg  Sepsis  protocol initiated  Fluid resuscitation ordered per protocol  IV antibiotics as appropriate for source of sepsis  Reassessment: I have reassessed the patient's hemodynamic status  End organ dysfunction include(s):  Acute respiratory failure and Acute kidney failure     SIRS 4/4 and qSOFA 2/3  Secondary to right-sided pyelonephritis from renal stone noted at outside facility  Urinalysis at outside facility with nitrates and leukocyte esterase  Received 2 L of normal saline at outside facility, an additional liter was given at our emergency department  Started on Rocephin antibiotic regimen at outside facility  Lactic acid 3.8 and increasing to 4.3 at our ED  Continue IV hydration  Continue Rocephin antibiotic regimen  Case discussed with urology, recommending nephrostomy tube placement by IR  Discussed case with interventional radiology, plan patient for right nephrostomy placement today  Follow blood and urine cultures    Lactic acidosis- (present on admission)  Assessment & Plan  3.8, 4.3  Map in the 60s  Aggressive IV hydration  Trend lactic acid    Pyelonephritis- (present on admission)  Assessment & Plan  Right-sided and noted at outside facility CT scan  Secondary to 3 mm stone  IV hydration  Continue IV antibiotics    Acute renal failure (ARF) (HCC)- (present on admission)  Assessment & Plan  Creatinine 1.8 at outside facility  Pending repeat chemistry  Secondary to prerenal and postrenal etiologies (septic shock/nephrolithiasis)  Right pyelonephritis noted on outside facility abdominal/pelvic CT  Continue IV hydration for now  Avoid nephrotoxins  Renal dose meds  Trend creatinine    DVT prophylaxis SCDs

## 2021-06-14 NOTE — ASSESSMENT & PLAN NOTE
Right-sided and noted at outside facility CT scan  Secondary to 3 mm stone  IV hydration  Continue IV antibiotics

## 2021-06-14 NOTE — PROGRESS NOTES
[facility] Daily Progress Note    Date of Service  6/14/2021    Chief Complaint  81 y.o. male admitted 6/14/2021 with fever, flank pain    Hospital Course  81 y.o. male with a past medical history of BPH status post vasectomy years ago presented to the [facility] emergency department on 6/13/2021 with 1 day history of worsening right-sided back pain, fever and chills.  Patient stating that he has had pain similar to such a month ago that resolved on its own.  He does have a history of untreated UTIs.  Patient denies any urinary frequency, dysuria or straining on urination.  At the [facility] emergency department, Vital signs with tachycardia at 120, 39 °C fever, tachypnea in the 20s.  Patient requiring 4 L nasal cannula to maintain saturation.  CBC without leukocytosis or anemia.  Chemistry with acute renal failure creatinine 1.8 and BUN 36.  Lactic acidosis at 3.8. Blood samples were collected for culture.  He received a 2 L bolus and was a start Rocephin regimen antibiotic regimen.       Patient was transferred to [facility] for sepsis secondary to pyonephritis. At the emergency department, vital signs with tachycardia in the 110s, tachypnea in the 20s, and hypotension with map at 63.  Patient was started on sepsis bolus and blood samples were collected for lab.  EDP as well as myself discussed case with Urology (Dr. Owens), who requested  tube placement by interventional radiology.  I discussed case with interventional radiology (Dr. Carvalho), who will evaluate case for nephrostomy tube placement indication.  Repeat CBC with leukocytosis at 17.1 with elevated polys and normocytic anemia with hemoglobin 12.6.  Patient was admitted to telemetry for sepsis secondary to pyelonephritis which required IV antibiotics.    Interval Problem Update  Admitted earlier this morning for sepsis due to pyelonephritis.  S/p R nephrostomy tube placement this morning via IR.  Patient with 3mm right UVJ stone per outside imaging. Urology  following, no plans for surgical intervention at this time.  BP borderline hypotensive, near 90/50. Patient clinically stable.  Received 4L fluids thus far, continue maintenance fluids. Start midodrine for additional BP support.  Cr 2.45 on presentation, GFR 25, unknown baseline.  FREDDY possibly prerenal from sepsis and post obstructive from stone.  Monitor renal function. Consider nephrology consult if kidney function worsens.    Consultants/Specialty  urology    Code Status  DNAR/DNI    Disposition  Inpatient, pending clinical improvement from sepsis, pyelonephritis, kidney stone.    Review of Systems  Review of Systems   Constitutional: Positive for chills. Negative for fever and malaise/fatigue.   Eyes: Negative for blurred vision and pain.   Respiratory: Negative for cough and shortness of breath.    Cardiovascular: Negative for chest pain, palpitations and leg swelling.   Gastrointestinal: Positive for nausea. Negative for abdominal pain, constipation, diarrhea and vomiting.   Genitourinary: Negative for dysuria and urgency.   Musculoskeletal: Positive for back pain. Negative for falls.   Skin: Negative for itching and rash.   Neurological: Negative for dizziness, sensory change, focal weakness and headaches.   Psychiatric/Behavioral: Negative for substance abuse. The patient does not have insomnia.         Physical Exam  Temp:  [37.1 °C (98.7 °F)-37.5 °C (99.5 °F)] 37.1 °C (98.8 °F)  Pulse:  [] 81  Resp:  [16-29] 18  BP: (75-96)/(48-56) 91/55  SpO2:  [93 %-96 %] 95 %    Physical Exam  Constitutional:       General: He is not in acute distress.     Appearance: He is not ill-appearing.   HENT:      Head: Normocephalic and atraumatic.      Right Ear: External ear normal.      Left Ear: External ear normal.      Mouth/Throat:      Pharynx: No oropharyngeal exudate or posterior oropharyngeal erythema.   Eyes:      Extraocular Movements: Extraocular movements intact.      Pupils: Pupils are equal, round, and  reactive to light.   Cardiovascular:      Rate and Rhythm: Normal rate and regular rhythm.      Pulses: Normal pulses.      Heart sounds: Normal heart sounds.   Pulmonary:      Effort: Pulmonary effort is normal. No respiratory distress.      Breath sounds: Normal breath sounds. No wheezing or rales.   Abdominal:      General: Bowel sounds are normal. There is no distension.      Palpations: Abdomen is soft.      Tenderness: There is no abdominal tenderness. There is no guarding.   Genitourinary:     Comments: R nephrostomy tube with red tinged output  Musculoskeletal:         General: No swelling or tenderness.      Cervical back: Normal range of motion and neck supple.   Skin:     General: Skin is warm and dry.   Neurological:      General: No focal deficit present.      Mental Status: He is oriented to person, place, and time.      Sensory: No sensory deficit.      Motor: No weakness.   Psychiatric:         Mood and Affect: Mood normal.         Behavior: Behavior normal.         Fluids    Intake/Output Summary (Last 24 hours) at 6/14/2021 1329  Last data filed at 6/14/2021 0449  Gross per 24 hour   Intake 1000 ml   Output --   Net 1000 ml       Laboratory  Recent Labs     06/14/21  0300   WBC 17.1*   RBC 3.84*   HEMOGLOBIN 12.6*   HEMATOCRIT 37.4*   MCV 97.4   MCH 32.8   MCHC 33.7   RDW 44.3   PLATELETCT 179   MPV 10.7     Recent Labs     06/14/21  0445   SODIUM 137   POTASSIUM 3.7   CHLORIDE 106   CO2 17*   GLUCOSE 123*   BUN 39*   CREATININE 2.45*   CALCIUM 8.2*     Recent Labs     06/14/21  0445   INR 1.45*               Imaging  US-RENAL   Final Result      Unremarkable renal ultrasound.      IR-PERQ NEPH CATH NEW ACCESS (ALL RADIOLOGY) RIGHT   Final Result      1. ULTRASOUND AND FLUOROSCOPIC GUIDED PLACEMENT OF A RIGHT 8 [place]  PIGTAIL LOCKING LOOP PERCUTANEOUS NEPHROSTOMY CATHETER.      2. NEPHROSTOGRAM SHOWING SATISFACTORY CATHETER POSITION WITHOUT EXTRAVASATION. MODERATE RIGHT HYDRONEPHROSIS AND MILD  RIGHT HYDROURETER.      DX-CHEST-PORTABLE (1 VIEW)   Final Result         1.  No focal infiltrates.   2.  Perihilar interstitial prominence and bronchial wall cuffing, appearance suggests changes of underlying bronchial inflammation, consider bronchitis.      OUTSIDE IMAGES-CT ABDOMEN /PELVIS   Final Result      OUTSIDE IMAGES-DX CHEST   Final Result      EC-ECHOCARDIOGRAM COMPLETE W/O CONT    (Results Pending)        Assessment/Plan  * Sepsis (HCC)- (present on admission)  Assessment & Plan  This is Severe Sepsis Present on admission  SIRS criteria identified on my evaluation include: Fever, with temperature greater than 101 deg F, Tachycardia, with heart rate greater than 90 BPM, Tachypnea, with respirations greater than 20 per minute and Leukocytosis, with WBC greater than 12,000  Source is right-sided pyelonephritis  Clinical indicators of end organ dysfunction include Systolic blood pressure (SBP) <90 mmHg or mean arterial pressure <65 mmHg  Sepsis protocol initiated  Fluid resuscitation ordered per protocol  IV antibiotics as appropriate for source of sepsis  Reassessment: I have reassessed the patient's hemodynamic status  End organ dysfunction include(s):  Acute respiratory failure and Acute kidney failure     SIRS 4/4 and qSOFA 2/3  Secondary to right-sided pyelonephritis from renal stone noted on outside facility CT  Continue IV hydration  Continue Rocephin antibiotic regimen  S/p R nephrostomy tube 6/14  Follow blood and urine cultures    DNR (do not resuscitate)- (present on admission)  Assessment & Plan  Had an extensive conversation with patient regarding CODE STATUS which patient reports that he would like to be DNR/DNI.  Patient voiced understanding and all questions were answered.    Lactic acidosis- (present on admission)  Assessment & Plan  3.8, 4.3  Map in the 60s  Aggressive IV hydration  Trend lactic acid    Pyelonephritis- (present on admission)  Assessment & Plan  Right-sided and noted at  outside facility CT scan  Secondary to 3 mm stone  IV hydration  Continue IV antibiotics    Acute renal failure (ARF) (HCC)- (present on admission)  Assessment & Plan  Creatinine 1.8 at outside facility  Cr 2.4 here  Secondary to prerenal and postrenal etiologies (septic shock/nephrolithiasis)  Right pyelonephritis noted on outside facility abdominal/pelvic CT  Continue IV hydration for now  Avoid nephrotoxins  Renal dose meds  Trend creatinine       VTE prophylaxis: SCDs

## 2021-06-14 NOTE — ASSESSMENT & PLAN NOTE
Creatinine 1.8 at outside facility  Cr 2.4 here  Secondary to prerenal and postrenal etiologies (septic shock/nephrolithiasis)  Right pyelonephritis noted on outside facility abdominal/pelvic CT  Continue IV hydration for now  Avoid nephrotoxins  Renal dose meds  Trend creatinine

## 2021-06-14 NOTE — ED TRIAGE NOTES
Chief Complaint   Patient presents with   • Fever   • Flank Pain     Pt transferred from [facility] for obstructive R sided kidney stone with sepsis. Has felt bad since yesterday and presented to ER with 102 fever and R flank pain.

## 2021-06-14 NOTE — PROGRESS NOTES
IR Nursing Note:    Procedure Confirmed with MD, patient and RN pre procedure. Consent obtained by MD with all questions answered, placed in Patient's chart. Patient assisted to the table in the prone position with all bony prominences padded and draped in sterile fashion.    Right Nephrostomy Tube Placement by MD Carvalho assisted by RT Josafat, right flank access site sutured in place and CDI with gauze and medipore tape.     End Tidal CO2 range 15-19 during procedure.  ?  8mL of urine collected from right kidney by Dr. Carvalho, specimen sent to Micro, delivered to lab by IR team.     Patient tolerated procedure, hemodynamically stable; pt drowsy but easily aroused post procedure; report given to RENNY German; patient transported to [place] [address] via IR RN monitored then transferred care to report RN.    CO Everywhere Flexima Regular 8F x 25cm  REF# [id number] LOT# [id number] EXP. 01/28/2024

## 2021-06-14 NOTE — H&P
Hospital Medicine History & Physical Note    Date of Service  6/14/2021    Primary Care Physician  No primary care provider on file.    Consultants  Urology (Dr. Owens)  Interventional radiology (Dr. Carvalho)    Code Status  DNAR/DNI    Chief Complaint  Chief Complaint   Patient presents with   • Fever   • Flank Pain       History of Presenting Illness  81 y.o. male with a past medical history of BPH status post vasectomy years ago presented to the [facility] emergency department on 6/13/2021 with 1 day history of worsening right-sided back pain, fever and chills.  Patient stating that he has had pain similar to such a month ago that resolved on its own.  He does have a history of untreated UTIs.  Patient denies any urinary frequency, dysuria or straining on urination.  At the [facility] emergency department, Vital signs with tachycardia at 120, 39 °C fever, tachypnea in the 20s.  Patient requiring 4 L nasal cannula to maintain saturation.  CBC without leukocytosis or anemia.  Chemistry with acute renal failure creatinine 1.8 and BUN 36.  Lactic acidosis at 3.8. Blood samples were collected for culture.  He received a 2 L bolus and was a start Rocephin regimen antibiotic regimen.      Patient was transferred to [facility] for sepsis secondary to pyonephritis. At the emergency department, vital signs with tachycardia in the 110s, tachypnea in the 20s, and hypotension with map at 63.  Patient was started on sepsis bolus and blood samples were collected for lab.  EDP as well as myself discussed case with Urology (Dr. Owens), who requested  tube placement by interventional radiology.  I discussed case with interventional radiology (Dr. Carvalho), who will evaluate case for nephrostomy tube placement indication.  Repeat CBC with leukocytosis at 17.1 with elevated polys and normocytic anemia with hemoglobin 12.6.  Patient was admitted to telemetry for sepsis secondary to pyelonephritis which required IV antibiotics.    Review of  Systems  Review of Systems   Constitutional: Positive for chills, fever and malaise/fatigue.   HENT: Negative.    Eyes: Negative.    Respiratory: Negative.    Cardiovascular: Negative.    Gastrointestinal: Negative.    Genitourinary: Positive for flank pain. Negative for dysuria, frequency and urgency.   Skin: Negative.    Neurological: Positive for dizziness and weakness. Negative for loss of consciousness.   Endo/Heme/Allergies: Negative.    Psychiatric/Behavioral: Negative.      Past Medical History  Patient denies past medical history    Surgical History  History of partial vasectomy years ago    Family History  Reviewed and noncontributory    Social History  No smoking, alcohol use or recreational drug use    Allergies  No Known Allergies    Medications  None       Physical Exam  Temp:  [37.1 °C (98.7 °F)-37.5 °C (99.5 °F)] 37.3 °C (99.2 °F)  Pulse:  [] 94  Resp:  [18-29] 18  BP: (75-96)/(48-54) 87/52  SpO2:  [93 %-96 %] 93 %    Physical Exam  Constitutional:       General: He is not in acute distress.     Appearance: He is not ill-appearing, toxic-appearing or diaphoretic.   HENT:      Head: Normocephalic and atraumatic.      Nose: Nose normal. No congestion.      Mouth/Throat:      Mouth: Mucous membranes are dry.   Eyes:      Extraocular Movements: Extraocular movements intact.      Pupils: Pupils are equal, round, and reactive to light.   Cardiovascular:      Rate and Rhythm: Regular rhythm. Tachycardia present.      Pulses: Normal pulses.      Heart sounds: Normal heart sounds.   Pulmonary:      Effort: Pulmonary effort is normal.      Breath sounds: Normal breath sounds.   Abdominal:      General: Bowel sounds are normal.      Palpations: Abdomen is soft.      Tenderness: There is no abdominal tenderness.   Musculoskeletal:         General: No swelling. Normal range of motion.      Cervical back: Normal range of motion and neck supple.   Skin:     General: Skin is warm.      Coloration: Skin is not  jaundiced.   Neurological:      General: No focal deficit present.      Mental Status: He is oriented to person, place, and time. Mental status is at baseline.      Cranial Nerves: No cranial nerve deficit.   Psychiatric:         Mood and Affect: Mood normal.         Behavior: Behavior normal.         Thought Content: Thought content normal.         Judgment: Judgment normal.         Laboratory:  Recent Labs     06/14/21  0300   WBC 17.1*   RBC 3.84*   HEMOGLOBIN 12.6*   HEMATOCRIT 37.4*   MCV 97.4   MCH 32.8   MCHC 33.7   RDW 44.3   PLATELETCT 179   MPV 10.7     Recent Labs     06/14/21  0445   SODIUM 137   POTASSIUM 3.7   CHLORIDE 106   CO2 17*   GLUCOSE 123*   BUN 39*   CREATININE 2.45*   CALCIUM 8.2*     Recent Labs     06/14/21  0445   ALTSGPT 28   ASTSGOT 43   ALKPHOSPHAT 68   TBILIRUBIN 0.4   GLUCOSE 123*     Recent Labs     06/14/21  0445   INR 1.45*     Recent Labs     06/14/21  0445   NTPROBNP 1676*         No results for input(s): TROPONINT in the last 72 hours.    Imaging:  IR-PERQ NEPH CATH NEW ACCESS (ALL RADIOLOGY) RIGHT   Final Result      1. ULTRASOUND AND FLUOROSCOPIC GUIDED PLACEMENT OF A RIGHT 8 [place]  PIGTAIL LOCKING LOOP PERCUTANEOUS NEPHROSTOMY CATHETER.      2. NEPHROSTOGRAM SHOWING SATISFACTORY CATHETER POSITION WITHOUT EXTRAVASATION. MODERATE RIGHT HYDRONEPHROSIS AND MILD RIGHT HYDROURETER.      DX-CHEST-PORTABLE (1 VIEW)   Final Result         1.  No focal infiltrates.   2.  Perihilar interstitial prominence and bronchial wall cuffing, appearance suggests changes of underlying bronchial inflammation, consider bronchitis.      OUTSIDE IMAGES-CT ABDOMEN /PELVIS   Final Result      OUTSIDE IMAGES-DX CHEST   Final Result      EC-ECHOCARDIOGRAM COMPLETE W/O CONT    (Results Pending)         Assessment/Plan:  I anticipate this patient will require at least two midnights for appropriate medical management, necessitating inpatient admission.    * Sepsis (HCC)- (present on admission)  Assessment &  Plan  This is Severe Sepsis Present on admission  SIRS criteria identified on my evaluation include: Fever, with temperature greater than 101 deg F, Tachycardia, with heart rate greater than 90 BPM, Tachypnea, with respirations greater than 20 per minute and Leukocytosis, with WBC greater than 12,000  Source is right-sided pyelonephritis  Clinical indicators of end organ dysfunction include Systolic blood pressure (SBP) <90 mmHg or mean arterial pressure <65 mmHg  Sepsis protocol initiated  Fluid resuscitation ordered per protocol  IV antibiotics as appropriate for source of sepsis  Reassessment: I have reassessed the patient's hemodynamic status  End organ dysfunction include(s):  Acute respiratory failure and Acute kidney failure     SIRS 4/4 and qSOFA 2/3  Secondary to right-sided pyelonephritis from renal stone noted at outside facility  Urinalysis at outside facility with nitrates and leukocyte esterase  Received 2 L of normal saline at outside facility, an additional liter was given at our emergency department  Started on Rocephin antibiotic regimen at outside facility  Lactic acid 3.8 and increasing to 4.3 at our ED  Continue IV hydration  Continue Rocephin antibiotic regimen  Case discussed with urology, recommending nephrostomy tube placement by IR  Discussed case with interventional radiology, plan patient for right nephrostomy placement today  Follow blood and urine cultures    Lactic acidosis- (present on admission)  Assessment & Plan  3.8, 4.3  Map in the 60s  Aggressive IV hydration  Trend lactic acid    Pyelonephritis- (present on admission)  Assessment & Plan  Right-sided and noted at outside facility CT scan  Secondary to 3 mm stone  IV hydration  Continue IV antibiotics    Acute renal failure (ARF) (HCC)- (present on admission)  Assessment & Plan  Creatinine 1.8 at outside facility  Pending repeat chemistry  Secondary to prerenal and postrenal etiologies (septic shock/nephrolithiasis)  Right  pyelonephritis noted on outside facility abdominal/pelvic CT  Continue IV hydration for now  Avoid nephrotoxins  Renal dose meds  Trend creatinine    DNR (do not resuscitate)- (present on admission)  Assessment & Plan  Had an extensive conversation with patient regarding CODE STATUS which patient reports that he would like to be DNR/DNI.  Patient voiced understanding and all questions were answered.    DVT prophylaxis SCDs  Critical care time 51 minutes

## 2021-06-14 NOTE — CARE PLAN
The patient is Watcher - Medium risk of patient condition declining or worsening    Shift Goals  Clinical Goals: Remain free from falls, vitals and labs remain stable   Patient Goals: pass this kidney stone     Progress made toward(s) clinical / shift goals:  no falls, monitoring labs       Patient is not progressing towards the following goals:

## 2021-06-14 NOTE — ED PROVIDER NOTES
ED Provider Note        Primary care provider: No primary care provider on file.    I verified that the patient was wearing a mask and I was wearing appropriate PPE every time I entered the room. The patient's mask was on the patient at all times during my encounter except for a brief view of the oropharynx.      CHIEF COMPLAINT  Chief Complaint   Patient presents with   • Fever   • Flank Pain       HPI:  Alex Odell is a 81 y.o. male who presents to the Emergency Department with chief complaint of infected kidney stone.  Patient transferred from outside hospital with above diagnosis.  Patient's been having some increasing right-sided flank pain over the last couple days.  He also reports that he is had intermittent chills subjectively and has not been able to get warm.  He reports no urinary hesitancy or urgency he has had no problems with bowel movements he feels very weak and fatigued he has had no headache altered mental status cough congestion chest pain or shortness of breath.  At the outside hospital he was found to have nitrite positive esterase positive urine with multiple white blood cells he was also found to have a lactic acidosis at 3.8 and elevated inflammatory markers.  CAT scan at outside hospital showed a 3 mm right-sided UVJ stone with some obstructive uropathy.    REVIEW OF SYSTEMS  10 systems reviewed and otherwise negative, pertinent positives and negatives listed in the history of present illness.    PAST MEDICAL HISTORY    Prostate cancer    SURGICAL HISTORY  Prostatectomy    patient denies any surgical history    SOCIAL HISTORY  Social History     Tobacco Use   • Smoking status: Not on file   Substance Use Topics   • Alcohol use: Not on file   • Drug use: Not on file      Social History     Substance and Sexual Activity   Drug Use Not on file       FAMILY HISTORY  Non-Contributory    CURRENT MEDICATIONS  Home Medications    **Home medications have not yet been reviewed for this  "encounter**         ALLERGIES  No Known Allergies    PHYSICAL EXAM  VITAL SIGNS: /56   Pulse (!) 107   Temp 37.5 °C (99.5 °F) (Oral)   Resp 20   Ht 1.803 m (5' 11\")   Wt 70.3 kg (155 lb)   SpO2 90%   BMI 21.62 kg/m²   Pulse ox interpretation: I interpret this pulse ox as normal.  Constitutional: Alert and oriented x 3, moderate distress  HEENT: Atraumatic normocephalic, pupils are equal round, extraocular movements are intact. The nares is clear, external ears are normal, mouth shows dry mucous membranes  Neck: no obvious JVD or tracheal deviation  Cardiovascular: Tachycardic no murmur rub or gallop   Thorax & Lungs: No respiratory distress, no wheezes rales or rhonchi, No chest tenderness.   GI: Soft nontender nondistended positive bowel sounds, no peritoneal signs  Skin: Warm dry no obvious acute rash or lesion  Musculoskeletal: Moving all extremities with normal range strength, no acute  deformity  Neurologic: Cranial nerves III through XII are grossly intact, no sensory deficit, no cerebellar dysfunction   Psychiatric: Appropriate affect for situation at this time      DIAGNOSTIC STUDIES / PROCEDURES  LABS      Results for orders placed or performed during the hospital encounter of 06/14/21   LACTIC ACID   Result Value Ref Range    Lactic Acid 4.3 (HH) 0.5 - 2.0 mmol/L   LACTIC ACID   Result Value Ref Range    Lactic Acid 4.0 (HH) 0.5 - 2.0 mmol/L   CBC WITH DIFFERENTIAL   Result Value Ref Range    WBC 17.1 (H) 4.8 - 10.8 K/uL    RBC 3.84 (L) 4.70 - 6.10 M/uL    Hemoglobin 12.6 (L) 14.0 - 18.0 g/dL    Hematocrit 37.4 (L) 42.0 - 52.0 %    MCV 97.4 81.4 - 97.8 fL    MCH 32.8 27.0 - 33.0 pg    MCHC 33.7 33.7 - 35.3 g/dL    RDW 44.3 35.9 - 50.0 fL    Platelet Count 179 164 - 446 K/uL    MPV 10.7 9.0 - 12.9 fL    Neutrophils-Polys 74.80 (H) 44.00 - 72.00 %    Lymphocytes 0.90 (L) 22.00 - 41.00 %    Monocytes 1.70 0.00 - 13.40 %    Eosinophils 0.00 0.00 - 6.90 %    Basophils 0.00 0.00 - 1.80 %    " Nucleated RBC 0.00 /100 WBC    Neutrophils (Absolute) 16.66 (H) 1.82 - 7.42 K/uL    Lymphs (Absolute) 0.15 (L) 1.00 - 4.80 K/uL    Monos (Absolute) 0.29 0.00 - 0.85 K/uL    Eos (Absolute) 0.00 0.00 - 0.51 K/uL    Baso (Absolute) 0.00 0.00 - 0.12 K/uL    NRBC (Absolute) 0.00 K/uL   SARS-COV Antigen RAUN: Collect dry nasal swab AND NP swab in VTM   Result Value Ref Range    SARS-CoV-2 Source Nasal Swab     SARS-COV ANTIGEN ARUN NotDetected Not-Detected   SARS-CoV-2 PCR (24 hour In-House): Collect NP swab in VTM    Specimen: Respirate   Result Value Ref Range    SARS-CoV-2 Source NP Swab    DIFFERENTIAL MANUAL   Result Value Ref Range    Bands-Stabs 22.60 (H) 0.00 - 10.00 %    Manual Diff Status PERFORMED    PERIPHERAL SMEAR REVIEW   Result Value Ref Range    Peripheral Smear Review see below    PLATELET ESTIMATE   Result Value Ref Range    Plt Estimation Normal    MORPHOLOGY   Result Value Ref Range    RBC Morphology Normal        All labs reviewed by me.      RADIOLOGY  DX-CHEST-PORTABLE (1 VIEW)   Final Result         1.  No focal infiltrates.   2.  Perihilar interstitial prominence and bronchial wall cuffing, appearance suggests changes of underlying bronchial inflammation, consider bronchitis.      OUTSIDE IMAGES-CT ABDOMEN /PELVIS   Final Result      OUTSIDE IMAGES-DX CHEST   Final Result      IR-PERQ NEPH CATH NEW ACCESS (ALL RADIOLOGY) RIGHT    (Results Pending)     The radiologist's interpretation of all radiological studies have been reviewed by me.    COURSE & MEDICAL DECISION MAKING  Pertinent Labs & Imaging studies reviewed. (See chart for details)    3:13 AM - Patient seen and examined at bedside.        Patient was given IV fluids based on tachycardia hypotension elevated lactic acid sepsis/septic shock, oral hydration was not attempted due to insufficiency for hydration, after fluids had reduction of lactic acid and improvement of symptoms    Medical Decision Making: Pleasant 81-year-old male presents  "from outside hospital with infected right-sided UVJ stone.  Patient had elevated lactic acid outside hospital he did have a normal white count but had elevated CRP.  He was given a gram of Rocephin and a liter of fluids he was transferred here for further evaluation and treatment at arrival patient has systolic blood pressure in the 90s he is tachycardic over 100 slightly tachypneic.  This represents severe sepsis and he also has minor endorgan damage with elevated BUN and creatinine.  Patient was given additional fluid bolus to achieve 30 cc/kg he was given an additional gram of Rocephin repeat cultures and inflammatory markers were redrawn lactic acid of 4.3 after some time in the ER resuscitation his lactic acid has decreased to 4.0 his pulse is improved his blood pressure is improving I discussed his case with urologist Dr. Quintana who feels as though the patient would most likely be best served by a nephrostomy at this time.  Of also discussed his case with hospitalist Dr. Coleman.  Patient is admitted to the hospitalist service for ongoing evaluation and treatment admitted in guarded condition.    Critical care:  40 minutes of critical care time was spent with this patient including initial evaluation initial resuscitation the ordering of labs EKGs images and rhythm strip interpretation of such. Interventions based on such.  Reviewing transferring paperwork discussing with EMS and nursing staff.  Discussing the case with the patient with multiple consulting physicians. Does not include separately billable procedures.      /56   Pulse (!) 107   Temp 37.5 °C (99.5 °F) (Oral)   Resp 20   Ht 1.803 m (5' 11\")   Wt 70.3 kg (155 lb)   SpO2 90%   BMI 21.62 kg/m²             FINAL IMPRESSION  1. Kidney stone Active   2. Sepsis with acute organ dysfunction, due to unspecified organism, unspecified type, unspecified whether septic shock present (HCC) Active   3.  Severe sepsis  4.  Lactic acidosis  5.  " Leukocytosis with bandemia  6.  Critical care 40 minutes    This dictation has been created using voice recognition software and/or scribes. The accuracy of the dictation is limited by the abilities of the software and the expertise of the scribes. I expect there may be some errors of grammar and possibly content. I made every attempt to manually correct the errors within my dictation. However, errors related to voice recognition software and/or scribes may still exist and should be interpreted within the appropriate context.

## 2021-06-15 LAB
ANION GAP SERPL CALC-SCNC: 7 MMOL/L (ref 7–16)
BUN SERPL-MCNC: 33 MG/DL (ref 8–22)
CALCIUM SERPL-MCNC: 8.1 MG/DL (ref 8.5–10.5)
CHLORIDE SERPL-SCNC: 109 MMOL/L (ref 96–112)
CO2 SERPL-SCNC: 21 MMOL/L (ref 20–33)
CREAT SERPL-MCNC: 1.68 MG/DL (ref 0.5–1.4)
ERYTHROCYTE [DISTWIDTH] IN BLOOD BY AUTOMATED COUNT: 47.4 FL (ref 35.9–50)
GLUCOSE SERPL-MCNC: 93 MG/DL (ref 65–99)
HCT VFR BLD AUTO: 34.7 % (ref 42–52)
HGB BLD-MCNC: 11.3 G/DL (ref 14–18)
LACTATE BLD-SCNC: 1.9 MMOL/L (ref 0.5–2)
MCH RBC QN AUTO: 32.7 PG (ref 27–33)
MCHC RBC AUTO-ENTMCNC: 32.6 G/DL (ref 33.7–35.3)
MCV RBC AUTO: 100.3 FL (ref 81.4–97.8)
PLATELET # BLD AUTO: 130 K/UL (ref 164–446)
PMV BLD AUTO: 11.1 FL (ref 9–12.9)
POTASSIUM SERPL-SCNC: 4.4 MMOL/L (ref 3.6–5.5)
RBC # BLD AUTO: 3.46 M/UL (ref 4.7–6.1)
SODIUM SERPL-SCNC: 137 MMOL/L (ref 135–145)
WBC # BLD AUTO: 28.6 K/UL (ref 4.8–10.8)

## 2021-06-15 PROCEDURE — 83605 ASSAY OF LACTIC ACID: CPT

## 2021-06-15 PROCEDURE — 700105 HCHG RX REV CODE 258: Performed by: STUDENT IN AN ORGANIZED HEALTH CARE EDUCATION/TRAINING PROGRAM

## 2021-06-15 PROCEDURE — 700102 HCHG RX REV CODE 250 W/ 637 OVERRIDE(OP): Performed by: STUDENT IN AN ORGANIZED HEALTH CARE EDUCATION/TRAINING PROGRAM

## 2021-06-15 PROCEDURE — 770020 HCHG ROOM/CARE - TELE (206)

## 2021-06-15 PROCEDURE — 700111 HCHG RX REV CODE 636 W/ 250 OVERRIDE (IP): Performed by: STUDENT IN AN ORGANIZED HEALTH CARE EDUCATION/TRAINING PROGRAM

## 2021-06-15 PROCEDURE — 85027 COMPLETE CBC AUTOMATED: CPT

## 2021-06-15 PROCEDURE — 36415 COLL VENOUS BLD VENIPUNCTURE: CPT

## 2021-06-15 PROCEDURE — 99232 SBSQ HOSP IP/OBS MODERATE 35: CPT | Performed by: INTERNAL MEDICINE

## 2021-06-15 PROCEDURE — 80048 BASIC METABOLIC PNL TOTAL CA: CPT

## 2021-06-15 PROCEDURE — 700101 HCHG RX REV CODE 250: Performed by: STUDENT IN AN ORGANIZED HEALTH CARE EDUCATION/TRAINING PROGRAM

## 2021-06-15 PROCEDURE — 51798 US URINE CAPACITY MEASURE: CPT

## 2021-06-15 PROCEDURE — A9270 NON-COVERED ITEM OR SERVICE: HCPCS | Performed by: STUDENT IN AN ORGANIZED HEALTH CARE EDUCATION/TRAINING PROGRAM

## 2021-06-15 RX ORDER — CEFAZOLIN SODIUM 1 G/50ML
1 INJECTION, SOLUTION INTRAVENOUS EVERY 12 HOURS
Status: DISCONTINUED | OUTPATIENT
Start: 2021-06-16 | End: 2021-06-16

## 2021-06-15 RX ADMIN — SODIUM CHLORIDE, POTASSIUM CHLORIDE, SODIUM LACTATE AND CALCIUM CHLORIDE: 600; 310; 30; 20 INJECTION, SOLUTION INTRAVENOUS at 12:28

## 2021-06-15 RX ADMIN — SODIUM CHLORIDE, POTASSIUM CHLORIDE, SODIUM LACTATE AND CALCIUM CHLORIDE: 600; 310; 30; 20 INJECTION, SOLUTION INTRAVENOUS at 20:54

## 2021-06-15 RX ADMIN — SODIUM CHLORIDE, POTASSIUM CHLORIDE, SODIUM LACTATE AND CALCIUM CHLORIDE: 600; 310; 30; 20 INJECTION, SOLUTION INTRAVENOUS at 00:47

## 2021-06-15 RX ADMIN — MIDODRINE HYDROCHLORIDE 5 MG: 5 TABLET ORAL at 17:52

## 2021-06-15 RX ADMIN — MIDODRINE HYDROCHLORIDE 5 MG: 5 TABLET ORAL at 12:28

## 2021-06-15 RX ADMIN — MIDODRINE HYDROCHLORIDE 5 MG: 5 TABLET ORAL at 08:36

## 2021-06-15 RX ADMIN — CEFTRIAXONE SODIUM 1 G: 10 INJECTION, POWDER, FOR SOLUTION INTRAVENOUS at 04:38

## 2021-06-15 ASSESSMENT — ENCOUNTER SYMPTOMS
NAUSEA: 0
SORE THROAT: 0
CHILLS: 0
FLANK PAIN: 1
ORTHOPNEA: 0
HEARTBURN: 0
SPEECH CHANGE: 0
SPUTUM PRODUCTION: 0
COUGH: 0
BLURRED VISION: 0
BRUISES/BLEEDS EASILY: 0
FLANK PAIN: 0
DOUBLE VISION: 0
PSYCHIATRIC NEGATIVE: 1
GASTROINTESTINAL NEGATIVE: 1
WHEEZING: 0
ABDOMINAL PAIN: 0
CONSTITUTIONAL NEGATIVE: 1
CLAUDICATION: 0
MYALGIAS: 0
EYES NEGATIVE: 1
DIARRHEA: 0
PHOTOPHOBIA: 0
PALPITATIONS: 0
VOMITING: 0
BACK PAIN: 0
CARDIOVASCULAR NEGATIVE: 1
MUSCULOSKELETAL NEGATIVE: 1
FEVER: 0
SHORTNESS OF BREATH: 0
WEAKNESS: 0
BLOOD IN STOOL: 0
RESPIRATORY NEGATIVE: 1
DIZZINESS: 0
SENSORY CHANGE: 0
EYE DISCHARGE: 0
NEUROLOGICAL NEGATIVE: 1
HEMOPTYSIS: 0
EYE PAIN: 0
DEPRESSION: 0
HEADACHES: 0
MYALGIAS: 1

## 2021-06-15 ASSESSMENT — LIFESTYLE VARIABLES
CONSUMPTION TOTAL: NEGATIVE
AVERAGE NUMBER OF DAYS PER WEEK YOU HAVE A DRINK CONTAINING ALCOHOL: 0
ALCOHOL_USE: NO
TOTAL SCORE: 0
ON A TYPICAL DAY WHEN YOU DRINK ALCOHOL HOW MANY DRINKS DO YOU HAVE: 0
EVER HAD A DRINK FIRST THING IN THE MORNING TO STEADY YOUR NERVES TO GET RID OF A HANGOVER: NO
HOW MANY TIMES IN THE PAST YEAR HAVE YOU HAD 5 OR MORE DRINKS IN A DAY: 0
TOTAL SCORE: 0
HAVE PEOPLE ANNOYED YOU BY CRITICIZING YOUR DRINKING: NO
TOTAL SCORE: 0
HAVE YOU EVER FELT YOU SHOULD CUT DOWN ON YOUR DRINKING: NO
EVER FELT BAD OR GUILTY ABOUT YOUR DRINKING: NO

## 2021-06-15 ASSESSMENT — PAIN DESCRIPTION - PAIN TYPE: TYPE: ACUTE PAIN

## 2021-06-15 ASSESSMENT — FIBROSIS 4 INDEX: FIB4 SCORE: 5.06

## 2021-06-15 NOTE — CONSULTS
DATE OF SERVICE:  06/14/2021     NEPHROLOGY CONSULTATION     REASON FOR CONSULTATION:  For evaluation and management of acute kidney   injury.     REQUESTING PHYSICIAN:  Is from [facility].     HISTORY OF PRESENT ILLNESS:  The patient is an 81-year-old male with history   of benign prostatic hypertrophy, history of vasectomy, initially presented to   ____ Emergency Department on 06/13 with 1-day history of worsening right-sided   back pain with fever and chills.  This has been going on for about a month   and resolved on its own.  He was found to have pyelonephritis.  His lactic   acidosis was 3.8 and his creatinine at ____ was 1.8.  He has no history of   kidney failure known per the patient.  He was transferred over to [facility] for further care for sepsis/pyelonephritis.  His blood   pressures here were in the low 70s systolic to 96 systolic.  He was   tachycardic.  He has been febrile here too on and off.  His BUN and creatinine   on this admission was 39/2.45 with a bicarb of 17.  Nephrology hence was   asked to see him for acute kidney injury.     REVIEW OF SYSTEMS:  CONSTITUTIONAL:  Positive for chills, fevers, malaise and fatigue.  HEENT:  Negative.  EYES:  Negative.  RESPIRATORY:  Negative.  CARDIOVASCULAR:  Negative.  GASTROINTESTINAL:  Negative.  GENITOURINARY:  Positive for right flank pain.  Negative for dysuria,   frequency and urgency.  SKIN:  Negative.  NEUROLOGIC:  Positive for dizziness and weakness.  Negative for loss of   consciousness.  ENDOCRINE/HEMATOLOGIC/ALLERGIES:  Negative.  PSYCHIATRIC/BEHAVIOR: Negative.     PAST MEDICAL HISTORY:  As mentioned above of BPH and vasectomy.     PAST SURGICAL HISTORY:  Vasectomy.     FAMILY HISTORY:  Noncontributory to current illness.     SOCIAL HISTORY:  He does not smoke, drink alcohol or use recreational drugs.     ALLERGIES:  No known drug allergies noted.     OUTPATIENT MEDICATIONS:  He does not take any.      CURRENT INPATIENT MEDICATIONS:  Include Tylenol, Rocephin, ProAmatine,   Donna-Colace, MiraLax, lactated Ringer's at 100 mL an hour.     PHYSICAL EXAMINATION:  VITAL SIGNS:  Revealing blood pressure of 91/55, currently with a pulse of 81,   temperature 37.1.  CONSTITUTIONAL:  General:  He is not in any acute distress.  Appearance:  He   is not ill appearing.  HEENT:  Head: Normocephalic and atraumatic.  Right ear: External ear is   normal.  Left ear:  External ear is normal.  Mouth and Throat:  Pharynx:  No   oropharyngeal exudate or posterior oropharyngeal erythema.  Eyes:  Extraocular   muscles are intact.  Pupils are equal, round and reactive to light.  CARDIOVASCULAR:  Rate and rhythm is normal.  Pulses are normal.  Heart sounds   are normal.  PULMONARY:  Effort is normal.  No respiratory distress. Breath sounds are   normal.  No wheezing or rales.  ABDOMEN:  Soft, nontender, nondistended.  Bowel sounds are present.  GENITOURINARY:  He has a right nephrostomy tube with red-tinged output noted.  MUSCULOSKELETAL:  General:  No swelling or tenderness.  CERVICAL AND BACK:  There is normal range of motion and neck is supple.  SKIN: General: Warm and dry.  NEUROLOGIC:  No focal deficits noted.  MENTAL STATUS:  He is oriented to person, place and time.  SENSORY:  No sensory deficits.  MOTOR:  No weakness.  PSYCHIATRIC:  Mood and affect are normal.  Behavior is normal.     LABORATORY DATA:  Sodium 137, potassium 3.7, chloride 106, bicarb 17, glucose   123, BUN and creatinine 39/2.45, calcium 8.2, albumin 2.8.  His white count 17   with a hemoglobin of 12.6, platelet count of 179.  Kidney ultrasound showing   a right kidney measuring 11.2 cm, left kidney measuring 9.0 cm with no   evidence of hydronephrosis.  The bladder is decompressed.  Bashir catheter is   present.  Chest x-ray revealing no focal infiltrates.     ASSESSMENT:  1.  Acute kidney injury, likely secondary to sepsis/hypoperfusion.  2.  Sepsis syndrome.  3.   Acidosis.  4.  Pyelonephritis.  5.  Anemia.     PLAN:  Agree with urology.  The patient currently has nephrostomy tube.    Renally dose antibiotics and avoid nephrotoxins.  Keep mean arterial pressure   greater than or equal to 65.     Thank you for allowing us to care for the patient.  We will follow him closely   with you.        ______________________________  MD DEANA CARLISLE/BOB/DALE    DD:  06/14/2021 16:30  DT:  06/14/2021 18:00    Job#:  [id number]

## 2021-06-15 NOTE — PROGRESS NOTES
S/p right nephrostomy tube placement today. Tea colored urine noted to both nephrostomy and dailey. We are straining all urine from the dailey, looking for pt to pass a stone. No c/o pain. Pt is AAA0x4, amb sba, Large Bm today.

## 2021-06-15 NOTE — PROGRESS NOTES
Assumed care of pt this am. Pt is A&O x4. Denies pain. Pt currently on room air oxygen saturation at 90% pt denies use of oxygen at home. Pt updated on current plan of care. Hourly rounding in place.

## 2021-06-15 NOTE — PROGRESS NOTES
Monitor Summary   Hr 79-92 SR with PVC  NE .14  QRS .06  QT .36      From monitor summary compared to tele strip

## 2021-06-15 NOTE — PROGRESS NOTES
Dailey catheter discontinued at this time per order. Patient tolerated dailey removal well, site is intact and WNL. Pt educated regarding voiding trial and pt verbalized understanding.

## 2021-06-15 NOTE — PROGRESS NOTES
Radiology Progress Note   Author: SNEHA Stephenson Date & Time created: 6/15/2021  11:44 AM   Date of admission  6/14/2021    Chief Complaint  81 y.o. male admitted 6/14/2021 with Fever and Flank Pain    HPI  The patient is an 81-year-old male with history   of benign prostatic hypertrophy, history of vasectomy, initially presented to   Emergency Department on 06/13 with 1-day history of worsening right-sided   back pain with fever and chills.  This has been going on for about a month   and resolved on its own.  He was found to have pyelonephritis    Interval Problem Update  IR  6/14- Right nephrostomy catheter placed     No past medical history on file.    Past Surgical History:   Procedure Laterality Date   • OTHER      90%prostate removed        Current Facility-Administered Medications   Medication Dose Route Frequency Provider Last Rate Last Admin   • cefTRIAXone (Rocephin) syringe 1 g  1 g Intravenous Q24HRS Gary Orellana M.D.   1 g at 06/15/21 0438   • lactated ringers infusion (BOLUS)  500 mL Intravenous Once PRN Gary Orellana M.D.       • senna-docusate (PERICOLACE or SENOKOT S) 8.6-50 MG per tablet 2 tablet  2 tablet Oral BID Gary Orellana M.D.        And   • polyethylene glycol/lytes (MIRALAX) PACKET 1 Packet  1 Packet Oral QDAY PRN Gary Orellana M.D.        And   • magnesium hydroxide (MILK OF MAGNESIA) suspension 30 mL  30 mL Oral QDAY PRN Gary Orellana M.D.        And   • bisacodyl (DULCOLAX) suppository 10 mg  10 mg Rectal QDAY PRN Gary Orellana M.D.       • enalaprilat (VASOTEC) injection 1.25 mg  1.25 mg Intravenous Q6HRS PRN Gary Orellana M.D.       • labetalol (NORMODYNE/TRANDATE) injection 10 mg  10 mg Intravenous Q4HRS PRN Gary Orellana M.D.       • ondansetron (ZOFRAN) syringe/vial injection 4 mg  4 mg Intravenous Q4HRS PRN Gary Orellana M.D.       • ondansetron (ZOFRAN ODT)  dispertab 4 mg  4 mg Oral Q4HRS PRN Gary Orellana M.D.       • lactated ringers infusion   Intravenous Continuous Gary Orellana M.D. 100 mL/hr at 06/15/21 0047 New Bag at 06/15/21 0047   • acetaminophen (TYLENOL) tablet 1,000 mg  1,000 mg Oral TID Gary Orellana M.D.       • midodrine (PROAMATINE) tablet 5 mg  5 mg Oral TID WITH MEALS Jimenez Muniz M.D.   5 mg at 06/15/21 0836       Social History     Socioeconomic History   • Marital status: Unknown     Spouse name: Not on file   • Number of children: Not on file   • Years of education: Not on file   • Highest education level: Not on file   Occupational History   • Not on file   Tobacco Use   • Smoking status: Never Smoker   • Smokeless tobacco: Never Used   Vaping Use   • Vaping Use: Never used   Substance and Sexual Activity   • Alcohol use: Never   • Drug use: Never   • Sexual activity: Not on file   Other Topics Concern   • Not on file   Social History Narrative   • Not on file     Social Determinants of Health     Financial Resource Strain:    • Difficulty of Paying Living Expenses:    Food Insecurity:    • Worried About Running Out of Food in the Last Year:    • Ran Out of Food in the Last Year:    Transportation Needs:    • Lack of Transportation (Medical):    • Lack of Transportation (Non-Medical):    Physical Activity:    • Days of Exercise per Week:    • Minutes of Exercise per Session:    Stress:    • Feeling of Stress :    Social Connections:    • Frequency of Communication with Friends and Family:    • Frequency of Social Gatherings with Friends and Family:    • Attends Jainism Services:    • Active Member of Clubs or Organizations:    • Attends Club or Organization Meetings:    • Marital Status:    Intimate Partner Violence:    • Fear of Current or Ex-Partner:    • Emotionally Abused:    • Physically Abused:    • Sexually Abused:        Family History   Problem Relation Age of Onset   • Cancer Mother         ROS  Review of Systems   Constitutional: Positive for malaise/fatigue. Negative for chills and fever.   HENT: Negative for hearing loss.    Eyes: Negative for blurred vision and pain.   Respiratory: Negative for cough, hemoptysis and shortness of breath.    Cardiovascular: Negative for chest pain and palpitations.   Gastrointestinal: Negative for nausea and vomiting.   Genitourinary: Positive for flank pain. Negative for dysuria and hematuria.   Musculoskeletal: Positive for myalgias.   Skin: Negative for rash.   Neurological: Negative for dizziness, weakness and headaches.   Endo/Heme/Allergies: Does not bruise/bleed easily.   Psychiatric/Behavioral: Negative for depression.        Physical Exam   Physical Exam  Constitutional:       General: He is not in acute distress.     Appearance: Normal appearance. He is not ill-appearing.   HENT:      Head: Normocephalic.      Nose: Nose normal.      Mouth/Throat:      Mouth: Mucous membranes are moist.      Pharynx: Oropharynx is clear.   Eyes:      Pupils: Pupils are equal, round, and reactive to light.   Cardiovascular:      Rate and Rhythm: Normal rate.      Pulses: Normal pulses.   Pulmonary:      Effort: Pulmonary effort is normal.      Breath sounds: No wheezing.   Chest:      Chest wall: No tenderness.   Abdominal:      General: Abdomen is flat. There is no distension.      Tenderness: There is no abdominal tenderness. There is no guarding.   Genitourinary:     Comments: Bashir present   Musculoskeletal:         General: No swelling or tenderness. Normal range of motion.      Cervical back: Normal range of motion.   Skin:     General: Skin is warm and dry.      Capillary Refill: Capillary refill takes less than 2 seconds.             Comments: Site CDI, dressing over surgical site   Tenderness on palpation      Neurological:      General: No focal deficit present.      Mental Status: He is alert and oriented to person, place, and time.      Cranial Nerves: No  cranial nerve deficit.      Motor: No weakness.   Psychiatric:         Mood and Affect: Mood normal.         Behavior: Behavior normal.       No Significant changes from previous ROS/ PE, please see previous note for details     Vital Signs  Blood Pressure : 110/59   Temperature: 37.2 °C (99 °F)   Pulse: 87   Respiration: 18   Pulse Oximetry: 94 %     Laboratory  Recent Labs     06/14/21  0300 06/15/21  0142   WBC 17.1* 28.6*   RBC 3.84* 3.46*   HEMOGLOBIN 12.6* 11.3*   HEMATOCRIT 37.4* 34.7*   MCV 97.4 100.3*   MCH 32.8 32.7   MCHC 33.7 32.6*   RDW 44.3 47.4   PLATELETCT 179 130*   MPV 10.7 11.1     Recent Labs     06/14/21  0445 06/15/21  0142   SODIUM 137 137   POTASSIUM 3.7 4.4   CHLORIDE 106 109   CO2 17* 21   GLUCOSE 123* 93   BUN 39* 33*   CREATININE 2.45* 1.68*   CALCIUM 8.2* 8.1*     Recent Labs     06/14/21  0445   INR 1.45*     Recent Labs     06/14/21  0445   ASTSGOT 43   ALTSGPT 28   TBILIRUBIN 0.4   ALKPHOSPHAT 68   GLOBULIN 2.7   INR 1.45*       Imaging  US-RENAL   Final Result      Unremarkable renal ultrasound.      IR-PERQ NEPH CATH NEW ACCESS (ALL RADIOLOGY) RIGHT   Final Result      1. ULTRASOUND AND FLUOROSCOPIC GUIDED PLACEMENT OF A RIGHT 8 [place]  PIGTAIL LOCKING LOOP PERCUTANEOUS NEPHROSTOMY CATHETER.      2. NEPHROSTOGRAM SHOWING SATISFACTORY CATHETER POSITION WITHOUT EXTRAVASATION. MODERATE RIGHT HYDRONEPHROSIS AND MILD RIGHT HYDROURETER.      DX-CHEST-PORTABLE (1 VIEW)   Final Result         1.  No focal infiltrates.   2.  Perihilar interstitial prominence and bronchial wall cuffing, appearance suggests changes of underlying bronchial inflammation, consider bronchitis.      OUTSIDE IMAGES-CT ABDOMEN /PELVIS   Final Result      OUTSIDE IMAGES-DX CHEST   Final Result      EC-ECHOCARDIOGRAM COMPLETE W/O CONT    (Results Pending)       Right nephrostomy tube placement     I personally reviewed the images above     Assessment  Principal Problem:    Sepsis (HCC)  Active Problems:    Acute renal  failure (ARF) (HCC)    Pyelonephritis    Lactic acidosis    DNR (do not resuscitate)    Right Hydronephrosis     Plan IR  Right Hydronephrosis- Right nephrostomy tube placed on 6/14   - site CDI  - tenderness on palpation   - Urine clear yellow   - Pending culture results   - WBC trending up, Creatine improving, lactic acid improving    - Urology following      Thank you for letting the Radiology team participate in the patients care. If Radiology team is required in future care please place IR order

## 2021-06-15 NOTE — PROGRESS NOTES
Received evening report from day RN. Pt is relaxed. Bed is locked in low position with call light and belongings within reach. POC discussed and all questions answered. All needs and requirements met at this time.

## 2021-06-15 NOTE — PROGRESS NOTES
Note to reader: this note follows the APSO format rather than the historical SOAP format. Assessment and plan located at the top of the note for ease of use.    Chief Complaint  81 y.o. year old male here with Fever and Flank Pain      Assessment/Plan  Interval History   Active Hospital Problems    Diagnosis    • Sepsis (HCC) [A41.9]    • Acute renal failure (ARF) (HCC) [N17.9]    • Pyelonephritis [N12]    • Lactic acidosis [E87.2]    • DNR (do not resuscitate) [Z66]      : Patient seen and examined    6/15.  No complaints.  Feeling better.  WBC up trending.  Cr improving.  Good UOP from RT NPT.      Disposition       - Remove Bashir and check PVR or bladder scan Q6hr  - strain urine  - Abx per culture results and hospital team  - Patient will need 2 weeks of appropriate abx before we can treat his stone  - Once medically cleared, he will be discharged home with NT until stone is treated in 2-3 weeks.  - Urology will follow      Review of Systems  Physical Exam   Review of Systems   Constitutional: Negative for chills and fever.   Respiratory: Negative for cough.    Genitourinary: Negative for flank pain.   All other systems reviewed and are negative.    Vitals:    06/15/21 0000 06/15/21 0443 06/15/21 0500 06/15/21 0731   BP:  109/62  110/59   Pulse:  95  87   Resp:  20  20   Temp:  37.2 °C (98.9 °F)  37.2 °C (99 °F)   TempSrc:  Temporal  Temporal   SpO2: 91% 92%  94%   Weight:   75.5 kg (166 lb 7.2 oz)    Height:         Physical Exam  Vitals and nursing note reviewed.   Constitutional:       Appearance: Normal appearance.   Genitourinary:     Comments: Bashir in place draining clear yellow urine  NPT in RT flank draining clear yellow urine  Skin:     General: Skin is warm and dry.   Neurological:      Mental Status: He is alert.   Psychiatric:         Mood and Affect: Mood normal.         Judgment: Judgment normal.          Hematology Chemistry   Lab Results   Component Value Date/Time    WBC 28.6 (H) 06/15/2021  01:42 AM    HEMOGLOBIN 11.3 (L) 06/15/2021 01:42 AM    HEMATOCRIT 34.7 (L) 06/15/2021 01:42 AM    PLATELETCT 130 (L) 06/15/2021 01:42 AM     Lab Results   Component Value Date/Time    SODIUM 137 06/15/2021 01:42 AM    POTASSIUM 4.4 06/15/2021 01:42 AM    CHLORIDE 109 06/15/2021 01:42 AM    CO2 21 06/15/2021 01:42 AM    GLUCOSE 93 06/15/2021 01:42 AM    BUN 33 (H) 06/15/2021 01:42 AM    CREATININE 1.68 (H) 06/15/2021 01:42 AM         Labs not explicitly included in this progress note were reviewed by the author.   Radiology/imaging not explicitly included in this progress note was reviewed by the author.     Core Measures

## 2021-06-15 NOTE — CARE PLAN
Problem: Knowledge Deficit - Standard  Goal: Patient and family/care givers will demonstrate understanding of plan of care, disease process/condition, diagnostic tests and medications  Outcome: Progressing     Problem: Hemodynamics  Goal: Patient's hemodynamics, fluid balance and neurologic status will be stable or improve  Outcome: Progressing     Problem: Fluid Volume  Goal: Fluid volume balance will be maintained  Outcome: Progressing     The patient is Watcher - Medium risk of patient condition declining or worsening    Shift Goals  Clinical Goals: Remain free from falls, vitals and labs remain stable   Patient Goals: pass this kidney stone     Progress made toward(s) clinical / shift goals:  Pt educated about ambulating with staff and to call when assistance is needed. Pt's blood pressure looking more stable.

## 2021-06-15 NOTE — PROGRESS NOTES
[facility] Daily Progress Note    Date of Service  6/15/2021    Chief Complaint  81 y.o. male admitted 6/14/2021 with flank pain  Hospital Course    81 y.o. male with a past medical history of BPH status post vasectomy years ago presented to the [facility] emergency department on 6/13/2021 with 1 day history of worsening right-sided back pain, fever and chills.  Patient stating that he has had pain similar to such a month ago that resolved on its own.  He does have a history of untreated UTIs.  Patient denies any urinary frequency, dysuria or straining on urination.  At the [facility] emergency department, Vital signs with tachycardia at 120, 39 °C fever, tachypnea in the 20s.  Patient requiring 4 L nasal cannula to maintain saturation.  CBC without leukocytosis or anemia.  Chemistry with acute renal failure creatinine 1.8 and BUN 36.  Lactic acidosis at 3.8. Blood samples were collected for culture.  He received a 2 L bolus and was a start Rocephin regimen antibiotic regimen.       Patient was transferred to [facility] for sepsis secondary to pyonephritis. At the emergency department, vital signs with tachycardia in the 110s, tachypnea in the 20s, and hypotension with map at 63.  Patient was started on sepsis bolus and blood samples were collected for lab.  EDP as well as myself discussed case with Urology (Dr. Owens), who requested  tube placement by interventional radiology.  I discussed case with interventional radiology (Dr. Carvalho), who will evaluate case for nephrostomy tube placement indication.  Repeat CBC with leukocytosis at 17.1 with elevated polys and normocytic anemia with hemoglobin 12.6.  Patient was admitted to telemetry for sepsis secondary to pyelonephritis which required IV antibiotics.       Interval Problem Update    6/15/2021: The patient was seen and evaluated at bedside and appears comfortable.  Patient underwent placement of nephrostomy tube yesterday courtesy of interventional radiology.  At this  time patient's creatinine level is continuing to improve.  He does have some postoperative leukocytosis which we will continue to monitor.  Urology with current recommendations to continue nephrostomy tube as well as antibiotic therapy for at least 2 weeks prior to definitive intervention for kidney stone.  At this time we are pending final urine culture prior to discharge.  Patient denies any chest pains, palpitations, shortness of breath.  States that he is tolerating oral intake and is ambulating independently.  Pain is currently well controlled.  No other overnight events reported.    Consultants/Specialty  Nephrology  Urology    Code Status  DNAR/DNI    Disposition  Pending final urine culture report    Review of Systems  Review of Systems   Constitutional: Negative for chills, fever and malaise/fatigue.   HENT: Negative for congestion, hearing loss, sore throat and tinnitus.    Eyes: Negative for blurred vision, double vision, photophobia and discharge.   Respiratory: Negative for cough, hemoptysis, sputum production, shortness of breath and wheezing.    Cardiovascular: Negative for chest pain, palpitations, orthopnea, claudication and leg swelling.   Gastrointestinal: Negative for abdominal pain, blood in stool, diarrhea, heartburn, melena, nausea and vomiting.   Genitourinary: Negative for dysuria, flank pain, hematuria and urgency.   Musculoskeletal: Negative for back pain, joint pain and myalgias.   Skin: Negative for itching and rash.   Neurological: Negative for dizziness, sensory change, speech change, weakness and headaches.   Endo/Heme/Allergies: Does not bruise/bleed easily.   Psychiatric/Behavioral: Negative for depression and suicidal ideas.        Physical Exam  Temp:  [36.7 °C (98.1 °F)-37.3 °C (99.1 °F)] 36.9 °C (98.5 °F)  Pulse:  [79-95] 79  Resp:  [17-20] 18  BP: ()/(57-65) 118/65  SpO2:  [90 %-95 %] 90 %    Physical Exam  Vitals reviewed.   Constitutional:       Appearance: Normal  appearance.   HENT:      Head: Normocephalic and atraumatic.      Nose: No congestion or rhinorrhea.      Mouth/Throat:      Mouth: Mucous membranes are moist.      Pharynx: Oropharynx is clear.   Eyes:      General: No scleral icterus.     Extraocular Movements: Extraocular movements intact.      Conjunctiva/sclera: Conjunctivae normal.      Pupils: Pupils are equal, round, and reactive to light.   Cardiovascular:      Rate and Rhythm: Normal rate and regular rhythm.      Heart sounds: No murmur heard.   No friction rub. No gallop.    Pulmonary:      Effort: Pulmonary effort is normal. No respiratory distress.      Breath sounds: Normal breath sounds. No stridor. No wheezing, rhonchi or rales.   Abdominal:      General: Abdomen is flat. Bowel sounds are normal. There is no distension.      Palpations: Abdomen is soft. There is no mass.      Tenderness: There is no abdominal tenderness. There is no guarding or rebound.   Musculoskeletal:         General: No swelling, tenderness or deformity.      Cervical back: Neck supple. No rigidity. No muscular tenderness.   Lymphadenopathy:      Cervical: No cervical adenopathy.   Skin:     General: Skin is warm and dry.      Findings: No erythema or rash.   Neurological:      General: No focal deficit present.      Mental Status: He is alert and oriented to person, place, and time. Mental status is at baseline.      Cranial Nerves: No cranial nerve deficit.      Sensory: No sensory deficit.      Motor: No weakness.   Psychiatric:         Mood and Affect: Mood normal.         Behavior: Behavior normal.         Thought Content: Thought content normal.         Fluids    Intake/Output Summary (Last 24 hours) at 6/15/2021 1515  Last data filed at 6/15/2021 1100  Gross per 24 hour   Intake 500 ml   Output 1580 ml   Net -1080 ml       Laboratory  Recent Labs     06/14/21  0300 06/15/21  0142   WBC 17.1* 28.6*   RBC 3.84* 3.46*   HEMOGLOBIN 12.6* 11.3*   HEMATOCRIT 37.4* 34.7*   MCV  97.4 100.3*   MCH 32.8 32.7   MCHC 33.7 32.6*   RDW 44.3 47.4   PLATELETCT 179 130*   MPV 10.7 11.1     Recent Labs     06/14/21  0445 06/15/21  0142   SODIUM 137 137   POTASSIUM 3.7 4.4   CHLORIDE 106 109   CO2 17* 21   GLUCOSE 123* 93   BUN 39* 33*   CREATININE 2.45* 1.68*   CALCIUM 8.2* 8.1*     Recent Labs     06/14/21  0445   INR 1.45*               Imaging  US-RENAL   Final Result      Unremarkable renal ultrasound.      IR-PERQ NEPH CATH NEW ACCESS (ALL RADIOLOGY) RIGHT   Final Result      1. ULTRASOUND AND FLUOROSCOPIC GUIDED PLACEMENT OF A RIGHT 8 Citizen of the [place]  PIGTAIL LOCKING LOOP PERCUTANEOUS NEPHROSTOMY CATHETER.      2. NEPHROSTOGRAM SHOWING SATISFACTORY CATHETER POSITION WITHOUT EXTRAVASATION. MODERATE RIGHT HYDRONEPHROSIS AND MILD RIGHT HYDROURETER.      DX-CHEST-PORTABLE (1 VIEW)   Final Result         1.  No focal infiltrates.   2.  Perihilar interstitial prominence and bronchial wall cuffing, appearance suggests changes of underlying bronchial inflammation, consider bronchitis.      OUTSIDE IMAGES-CT ABDOMEN /PELVIS   Final Result      OUTSIDE IMAGES-DX CHEST   Final Result      EC-ECHOCARDIOGRAM COMPLETE W/O CONT    (Results Pending)        Assessment/Plan  * Sepsis (HCC)- (present on admission)  Assessment & Plan  This is Severe Sepsis Present on admission  SIRS criteria identified on my evaluation include: Fever, with temperature greater than 101 deg F, Tachycardia, with heart rate greater than 90 BPM, Tachypnea, with respirations greater than 20 per minute and Leukocytosis, with WBC greater than 12,000  Source is right-sided pyelonephritis  Clinical indicators of end organ dysfunction include Systolic blood pressure (SBP) <90 mmHg or mean arterial pressure <65 mmHg  Sepsis protocol initiated  Fluid resuscitation ordered per protocol  IV antibiotics as appropriate for source of sepsis  Reassessment: I have reassessed the patient's hemodynamic status  End organ dysfunction include(s):  Acute  respiratory failure and Acute kidney failure     SIRS 4/4 and qSOFA 2/3  Secondary to right-sided pyelonephritis from renal stone noted on outside facility CT  Continue IV hydration  Continue Rocephin antibiotic regimen  S/p R nephrostomy tube 6/14  Follow blood and urine cultures    DNR (do not resuscitate)- (present on admission)  Assessment & Plan  Had an extensive conversation with patient regarding CODE STATUS which patient reports that he would like to be DNR/DNI.  Patient voiced understanding and all questions were answered.    Lactic acidosis- (present on admission)  Assessment & Plan  3.8, 4.3  Map in the 60s  Aggressive IV hydration  Trend lactic acid    Pyelonephritis- (present on admission)  Assessment & Plan  Right-sided and noted at outside facility CT scan  Secondary to 3 mm stone  IV hydration  Continue IV antibiotics    Acute renal failure (ARF) (HCC)- (present on admission)  Assessment & Plan  Creatinine 1.8 at outside facility  Cr 2.4 here  Secondary to prerenal and postrenal etiologies (septic shock/nephrolithiasis)  Right pyelonephritis noted on outside facility abdominal/pelvic CT  Continue IV hydration for now  Avoid nephrotoxins  Renal dose meds  Trend creatinine         VTE prophylaxis: SCDs and ambulation

## 2021-06-15 NOTE — CARE PLAN
The patient is Stable - Low risk of patient condition declining or worsening    Shift Goals  Clinical Goals: titrate oxygen to room air. pain control   Patient Goals: pass kidney stone     Progress made toward(s) clinical / shift goals:  pt currently titrated to room air oxygen saturation 90%. Pt denies pain at this time.   Problem: Knowledge Deficit - Standard  Goal: Patient and family/care givers will demonstrate understanding of plan of care, disease process/condition, diagnostic tests and medications  Outcome: Progressing     Problem: Hemodynamics  Goal: Patient's hemodynamics, fluid balance and neurologic status will be stable or improve  Outcome: Progressing     Problem: Fluid Volume  Goal: Fluid volume balance will be maintained  Outcome: Progressing       Patient is not progressing towards the following goals:

## 2021-06-15 NOTE — PROGRESS NOTES
[facility] Nephrology Daily Progress Note    Date of Service  6/15/2021    Chief Complaint  The patient is an 81-year-old male with history of benign prostatic hypertrophy, history of vasectomy, initially presented to [facility] Emergency Department on 06/13 with 1-day history of worsening right-sided   back pain with fever and chills.  This has been going on for about a month and resolved on its own.  He was found to have pyelonephritis.  His lactic acidosis was 3.8.  He has no history of kidney failure known per the patient.  He was transferred over to [facility] for further care for sepsis/pyelonephritis.  His blood pressures here were in the low 70s systolic to 96 systolic.  He was tachycardic.  He has been febrile here too on and off.  His BUN and creatinine on this admission was 39/2.45 with a bicarb of 17.  Nephrology hence was asked to see him for acute kidney injury.     Interval Problem Update  Daily Nephrology Summary:  6/15/21 - No new overnight renal events. Feels fine. Scr down to 1.68    Review of Systems  Review of Systems   Constitutional: Negative.    HENT: Negative.    Eyes: Negative.    Respiratory: Negative.    Cardiovascular: Negative.    Gastrointestinal: Negative.    Genitourinary: Negative.    Musculoskeletal: Negative.    Skin: Negative.    Neurological: Negative.    Endo/Heme/Allergies: Negative.    Psychiatric/Behavioral: Negative.         Physical Exam  Temp:  [36.7 °C (98.1 °F)-37.3 °C (99.1 °F)] 37.2 °C (99 °F)  Pulse:  [80-95] 87  Resp:  [17-20] 20  BP: ()/(55-62) 110/59  SpO2:  [91 %-95 %] 94 %    Physical Exam  Vitals and nursing note reviewed.   Constitutional:       Appearance: Normal appearance.   HENT:      Head: Normocephalic and atraumatic.      Right Ear: Tympanic membrane normal.      Left Ear: Tympanic membrane normal.      Nose: Nose normal. No congestion.      Mouth/Throat:      Mouth: Mucous membranes are moist.      Pharynx: Oropharynx is clear.    Eyes:      Extraocular Movements: Extraocular movements intact.      Conjunctiva/sclera: Conjunctivae normal.      Pupils: Pupils are equal, round, and reactive to light.   Cardiovascular:      Rate and Rhythm: Normal rate and regular rhythm.   Pulmonary:      Effort: Pulmonary effort is normal.      Breath sounds: Normal breath sounds.   Abdominal:      General: Abdomen is flat.      Palpations: Abdomen is soft.   Musculoskeletal:         General: Normal range of motion.      Cervical back: Normal range of motion and neck supple.   Skin:     General: Skin is warm and dry.      Capillary Refill: Capillary refill takes less than 2 seconds.   Neurological:      Mental Status: He is alert.   Psychiatric:         Mood and Affect: Mood normal.         Behavior: Behavior normal.         Thought Content: Thought content normal.         Judgment: Judgment normal.         Fluids    Intake/Output Summary (Last 24 hours) at 6/15/2021 0937  Last data filed at 6/15/2021 0438  Gross per 24 hour   Intake 500 ml   Output 1230 ml   Net -730 ml       Laboratory  Recent Labs     06/14/21  0300 06/15/21  0142   WBC 17.1* 28.6*   RBC 3.84* 3.46*   HEMOGLOBIN 12.6* 11.3*   HEMATOCRIT 37.4* 34.7*   MCV 97.4 100.3*   MCH 32.8 32.7   MCHC 33.7 32.6*   RDW 44.3 47.4   PLATELETCT 179 130*   MPV 10.7 11.1     Recent Labs     06/14/21  0445 06/15/21  0142   SODIUM 137 137   POTASSIUM 3.7 4.4   CHLORIDE 106 109   CO2 17* 21   GLUCOSE 123* 93   BUN 39* 33*   CREATININE 2.45* 1.68*   CALCIUM 8.2* 8.1*     Recent Labs     06/14/21  0445   INR 1.45*     Recent Labs     06/14/21  0445   NTPROBNP 1676*             ASSESSMENT:  1.  Acute kidney injury, likely secondary to sepsis/hypoperfusion.  2.  Sepsis syndrome.  3.  Acidosis.  4.  Pyelonephritis.  5.  Anemia.     PLAN: Scr is trending in the right direction. CPM. Renally dose antibiotics and avoid nephrotoxins.  Keep mean arterial pressure greater than or equal to 65.

## 2021-06-16 ENCOUNTER — APPOINTMENT (OUTPATIENT)
Dept: CARDIOLOGY | Facility: MEDICAL CENTER | Age: 81
DRG: 871 | End: 2021-06-16
Attending: STUDENT IN AN ORGANIZED HEALTH CARE EDUCATION/TRAINING PROGRAM
Payer: MEDICARE

## 2021-06-16 PROBLEM — R78.81 BACTEREMIA: Status: ACTIVE | Noted: 2021-06-16

## 2021-06-16 LAB
ANION GAP SERPL CALC-SCNC: 7 MMOL/L (ref 7–16)
BACTERIA UR CULT: NORMAL
BUN SERPL-MCNC: 31 MG/DL (ref 8–22)
CALCIUM SERPL-MCNC: 8.1 MG/DL (ref 8.5–10.5)
CHLORIDE SERPL-SCNC: 107 MMOL/L (ref 96–112)
CO2 SERPL-SCNC: 22 MMOL/L (ref 20–33)
CREAT SERPL-MCNC: 1.43 MG/DL (ref 0.5–1.4)
ERYTHROCYTE [DISTWIDTH] IN BLOOD BY AUTOMATED COUNT: 47.6 FL (ref 35.9–50)
GLUCOSE SERPL-MCNC: 98 MG/DL (ref 65–99)
HCT VFR BLD AUTO: 35 % (ref 42–52)
HGB BLD-MCNC: 11.4 G/DL (ref 14–18)
LV EJECT FRACT  99904: 60
LV EJECT FRACT MOD 2C 99903: 71.85
LV EJECT FRACT MOD 4C 99902: 61.74
LV EJECT FRACT MOD BP 99901: 65.41
MAGNESIUM SERPL-MCNC: 1.8 MG/DL (ref 1.5–2.5)
MCH RBC QN AUTO: 32.9 PG (ref 27–33)
MCHC RBC AUTO-ENTMCNC: 32.6 G/DL (ref 33.7–35.3)
MCV RBC AUTO: 100.9 FL (ref 81.4–97.8)
PHOSPHATE SERPL-MCNC: 2.4 MG/DL (ref 2.5–4.5)
PLATELET # BLD AUTO: 130 K/UL (ref 164–446)
PMV BLD AUTO: 11.8 FL (ref 9–12.9)
POTASSIUM SERPL-SCNC: 4.3 MMOL/L (ref 3.6–5.5)
RBC # BLD AUTO: 3.47 M/UL (ref 4.7–6.1)
SIGNIFICANT IND 70042: NORMAL
SITE SITE: NORMAL
SODIUM SERPL-SCNC: 136 MMOL/L (ref 135–145)
SOURCE SOURCE: NORMAL
WBC # BLD AUTO: 27 K/UL (ref 4.8–10.8)

## 2021-06-16 PROCEDURE — 36415 COLL VENOUS BLD VENIPUNCTURE: CPT

## 2021-06-16 PROCEDURE — 700111 HCHG RX REV CODE 636 W/ 250 OVERRIDE (IP): Performed by: STUDENT IN AN ORGANIZED HEALTH CARE EDUCATION/TRAINING PROGRAM

## 2021-06-16 PROCEDURE — 99232 SBSQ HOSP IP/OBS MODERATE 35: CPT | Performed by: INTERNAL MEDICINE

## 2021-06-16 PROCEDURE — 93306 TTE W/DOPPLER COMPLETE: CPT | Mod: 26 | Performed by: INTERNAL MEDICINE

## 2021-06-16 PROCEDURE — 99221 1ST HOSP IP/OBS SF/LOW 40: CPT | Performed by: INTERNAL MEDICINE

## 2021-06-16 PROCEDURE — 87040 BLOOD CULTURE FOR BACTERIA: CPT

## 2021-06-16 PROCEDURE — 85027 COMPLETE CBC AUTOMATED: CPT

## 2021-06-16 PROCEDURE — 770020 HCHG ROOM/CARE - TELE (206)

## 2021-06-16 PROCEDURE — 700101 HCHG RX REV CODE 250: Performed by: STUDENT IN AN ORGANIZED HEALTH CARE EDUCATION/TRAINING PROGRAM

## 2021-06-16 PROCEDURE — 700105 HCHG RX REV CODE 258: Performed by: STUDENT IN AN ORGANIZED HEALTH CARE EDUCATION/TRAINING PROGRAM

## 2021-06-16 PROCEDURE — 83735 ASSAY OF MAGNESIUM: CPT

## 2021-06-16 PROCEDURE — A9270 NON-COVERED ITEM OR SERVICE: HCPCS | Performed by: STUDENT IN AN ORGANIZED HEALTH CARE EDUCATION/TRAINING PROGRAM

## 2021-06-16 PROCEDURE — 80048 BASIC METABOLIC PNL TOTAL CA: CPT

## 2021-06-16 PROCEDURE — 700102 HCHG RX REV CODE 250 W/ 637 OVERRIDE(OP): Performed by: STUDENT IN AN ORGANIZED HEALTH CARE EDUCATION/TRAINING PROGRAM

## 2021-06-16 PROCEDURE — 84100 ASSAY OF PHOSPHORUS: CPT

## 2021-06-16 PROCEDURE — 700111 HCHG RX REV CODE 636 W/ 250 OVERRIDE (IP): Performed by: INTERNAL MEDICINE

## 2021-06-16 PROCEDURE — 93306 TTE W/DOPPLER COMPLETE: CPT

## 2021-06-16 RX ORDER — CEFAZOLIN SODIUM 2 G/100ML
2 INJECTION, SOLUTION INTRAVENOUS EVERY 8 HOURS
Status: DISCONTINUED | OUTPATIENT
Start: 2021-06-16 | End: 2021-06-22

## 2021-06-16 RX ADMIN — MIDODRINE HYDROCHLORIDE 5 MG: 5 TABLET ORAL at 12:15

## 2021-06-16 RX ADMIN — MIDODRINE HYDROCHLORIDE 5 MG: 5 TABLET ORAL at 16:46

## 2021-06-16 RX ADMIN — CEFAZOLIN SODIUM 2 G: 2 INJECTION, SOLUTION INTRAVENOUS at 21:23

## 2021-06-16 RX ADMIN — NAFCILLIN SODIUM 2 G: 2 INJECTION, POWDER, FOR SOLUTION INTRAMUSCULAR; INTRAVENOUS at 01:22

## 2021-06-16 RX ADMIN — MIDODRINE HYDROCHLORIDE 5 MG: 5 TABLET ORAL at 09:10

## 2021-06-16 RX ADMIN — NAFCILLIN SODIUM 2 G: 2 INJECTION, POWDER, FOR SOLUTION INTRAMUSCULAR; INTRAVENOUS at 05:07

## 2021-06-16 RX ADMIN — CEFAZOLIN SODIUM 2 G: 2 INJECTION, SOLUTION INTRAVENOUS at 15:11

## 2021-06-16 RX ADMIN — CEFTRIAXONE SODIUM 1 G: 10 INJECTION, POWDER, FOR SOLUTION INTRAVENOUS at 05:03

## 2021-06-16 ASSESSMENT — ENCOUNTER SYMPTOMS
GASTROINTESTINAL NEGATIVE: 1
HEARTBURN: 0
PHOTOPHOBIA: 0
DEPRESSION: 0
CHILLS: 0
VOMITING: 0
EYES NEGATIVE: 1
CARDIOVASCULAR NEGATIVE: 1
BLURRED VISION: 0
MUSCULOSKELETAL NEGATIVE: 1
NAUSEA: 0
ORTHOPNEA: 0
PALPITATIONS: 0
MYALGIAS: 0
DOUBLE VISION: 0
CONSTITUTIONAL NEGATIVE: 1
RESPIRATORY NEGATIVE: 1
FLANK PAIN: 0
NEUROLOGICAL NEGATIVE: 1
FEVER: 0
HEMOPTYSIS: 0
HEADACHES: 0
COUGH: 0
DIZZINESS: 0
PSYCHIATRIC NEGATIVE: 1
BACK PAIN: 0
BRUISES/BLEEDS EASILY: 0
SPEECH CHANGE: 0
SENSORY CHANGE: 0
SORE THROAT: 0

## 2021-06-16 ASSESSMENT — FIBROSIS 4 INDEX: FIB4 SCORE: 5.06

## 2021-06-16 NOTE — PROGRESS NOTES
Assumed care at 0715, bedside report received from NOC RN. Pt is SR on the telemetry monitor. Patient is AO x 4 and is resting in bed. Initial assessment completed and orders reviewed. POC discussed with patient. Call light within reach and hourly rounding in place. No further questions at this time. Fall precautions in place.      1030: Pt moved to [id number]. Report given to RENNY Diaz.

## 2021-06-16 NOTE — DISCHARGE PLANNING
Anticipated Discharge Disposition: Home    Action: per IDT rounds, pt medically cleared in a day or two. Pt pending Infectious Disease Recs and may need home infusion setup.     Barriers to Discharge: medical clearance, Infectious disease recs    Plan: f/u with medical team and pt to discuss dc needs and barriers.

## 2021-06-16 NOTE — CARE PLAN
Problem: Knowledge Deficit - Standard  Goal: Patient and family/care givers will demonstrate understanding of plan of care, disease process/condition, diagnostic tests and medications  Outcome: Progressing     Problem: Urinary - Renal Perfusion  Goal: Ability to achieve and maintain adequate renal perfusion and functioning will improve  Outcome: Progressing     The patient is Watcher - Medium risk of patient condition declining or worsening    Shift Goals  Clinical Goals: titrate oxygen to room air. pain control   Patient Goals: pass kidney stone     Progress made toward(s) clinical / shift goals:  Pt needing O2 when laying in bed d/t low  numbers, pt sustained on 1L with low 90's%. Pt educated about possibly needing sustained oxygen for care.     Patient is not progressing towards the following goals:      Problem: Respiratory  Goal: Patient will achieve/maintain optimum respiratory ventilation and gas exchange  Outcome: Not Progressing

## 2021-06-16 NOTE — PROGRESS NOTES
[facility] Medicine Daily Progress Note    Date of Service  6/16/2021    Chief Complaint  81 y.o. male admitted 6/14/2021 with flank pain  Hospital Course    81 y.o. male with a past medical history of BPH status post vasectomy years ago presented to the [facility] emergency department on 6/13/2021 with 1 day history of worsening right-sided back pain, fever and chills.  Patient stating that he has had pain similar to such a month ago that resolved on its own.  He does have a history of untreated UTIs.  Patient denies any urinary frequency, dysuria or straining on urination.  At the [facility] emergency department, Vital signs with tachycardia at 120, 39 °C fever, tachypnea in the 20s.  Patient requiring 4 L nasal cannula to maintain saturation.  CBC without leukocytosis or anemia.  Chemistry with acute renal failure creatinine 1.8 and BUN 36.  Lactic acidosis at 3.8. Blood samples were collected for culture.  He received a 2 L bolus and was a start Rocephin regimen antibiotic regimen.       Patient was transferred to [facility] for sepsis secondary to pyonephritis. At the emergency department, vital signs with tachycardia in the 110s, tachypnea in the 20s, and hypotension with map at 63.  Patient was started on sepsis bolus and blood samples were collected for lab.  EDP as well as myself discussed case with Urology (Dr. Owens), who requested  tube placement by interventional radiology.  I discussed case with interventional radiology (Dr. Carvalho), who will evaluate case for nephrostomy tube placement indication.  Repeat CBC with leukocytosis at 17.1 with elevated polys and normocytic anemia with hemoglobin 12.6.  Patient was admitted to telemetry for sepsis secondary to pyelonephritis which required IV antibiotics.       Interval Problem Update    6/15/2021: The patient was seen and evaluated at bedside and appears comfortable.  Patient underwent placement of nephrostomy tube yesterday courtesy of interventional radiology.  At this  time patient's creatinine level is continuing to improve.  He does have some postoperative leukocytosis which we will continue to monitor.  Urology with current recommendations to continue nephrostomy tube as well as antibiotic therapy for at least 2 weeks prior to definitive intervention for kidney stone.  At this time we are pending final urine culture prior to discharge.  Patient denies any chest pains, palpitations, shortness of breath.  States that he is tolerating oral intake and is ambulating independently.  Pain is currently well controlled.  No other overnight events reported.    6/16/2021: Patient noted to have positive blood culture for MSSA.  Pending final cultures and sensitivities.  ID has been consulted and appreciate their recommendations.  Pending repeat blood cultures.  At this time patient remains comfortable.  Denies any chest pains, palpitations, shortness of breath.  No other overnight events reported.    Consultants/Specialty  Nephrology  Urology    Code Status  DNAR/DNI    Disposition  Pending final urine culture report    Review of Systems  Review of Systems   Constitutional: Negative for chills, fever and malaise/fatigue.   HENT: Negative for congestion, hearing loss, sore throat and tinnitus.    Eyes: Negative for blurred vision, double vision and photophobia.   Respiratory: Negative for cough and hemoptysis.    Cardiovascular: Negative for chest pain, palpitations and orthopnea.   Gastrointestinal: Negative for heartburn, nausea and vomiting.   Genitourinary: Negative for dysuria and urgency.   Musculoskeletal: Negative for back pain and myalgias.   Skin: Negative for itching and rash.   Neurological: Negative for dizziness, sensory change, speech change and headaches.   Endo/Heme/Allergies: Does not bruise/bleed easily.   Psychiatric/Behavioral: Negative for depression and suicidal ideas.        Physical Exam  Temp:  [36.8 °C (98.3 °F)-37.6 °C (99.6 °F)] 36.9 °C (98.4 °F)  Pulse:   [57-80] 57  Resp:  [15-18] 15  BP: (122-136)/(70-83) 135/81  SpO2:  [91 %-96 %] 96 %    Physical Exam  Vitals reviewed.   Constitutional:       Appearance: Normal appearance.   HENT:      Head: Normocephalic and atraumatic.      Nose: No congestion or rhinorrhea.      Mouth/Throat:      Mouth: Mucous membranes are moist.      Pharynx: Oropharynx is clear.   Eyes:      General: No scleral icterus.     Extraocular Movements: Extraocular movements intact.      Conjunctiva/sclera: Conjunctivae normal.      Pupils: Pupils are equal, round, and reactive to light.   Cardiovascular:      Rate and Rhythm: Normal rate and regular rhythm.      Heart sounds: No murmur heard.   No friction rub. No gallop.    Pulmonary:      Effort: Pulmonary effort is normal. No respiratory distress.      Breath sounds: Normal breath sounds. No stridor.   Abdominal:      General: Abdomen is flat. Bowel sounds are normal.      Palpations: Abdomen is soft.   Musculoskeletal:         General: No swelling, tenderness or deformity.      Cervical back: Neck supple. No rigidity. No muscular tenderness.   Lymphadenopathy:      Cervical: No cervical adenopathy.   Skin:     General: Skin is warm and dry.      Findings: No erythema or rash.   Neurological:      General: No focal deficit present.      Mental Status: He is alert and oriented to person, place, and time. Mental status is at baseline.      Cranial Nerves: No cranial nerve deficit.      Sensory: No sensory deficit.   Psychiatric:         Mood and Affect: Mood normal.         Behavior: Behavior normal.         Thought Content: Thought content normal.         Fluids    Intake/Output Summary (Last 24 hours) at 6/16/2021 1551  Last data filed at 6/16/2021 0900  Gross per 24 hour   Intake 740 ml   Output 1050 ml   Net -310 ml       Laboratory  Recent Labs     06/14/21  0300 06/15/21  0142 06/16/21  0110   WBC 17.1* 28.6* 27.0*   RBC 3.84* 3.46* 3.47*   HEMOGLOBIN 12.6* 11.3* 11.4*   HEMATOCRIT 37.4*  34.7* 35.0*   MCV 97.4 100.3* 100.9*   MCH 32.8 32.7 32.9   MCHC 33.7 32.6* 32.6*   RDW 44.3 47.4 47.6   PLATELETCT 179 130* 130*   MPV 10.7 11.1 11.8     Recent Labs     06/14/21  0445 06/15/21  0142 06/16/21  0110   SODIUM 137 137 136   POTASSIUM 3.7 4.4 4.3   CHLORIDE 106 109 107   CO2 17* 21 22   GLUCOSE 123* 93 98   BUN 39* 33* 31*   CREATININE 2.45* 1.68* 1.43*   CALCIUM 8.2* 8.1* 8.1*     Recent Labs     06/14/21  0445   INR 1.45*               Imaging  EC-ECHOCARDIOGRAM COMPLETE W/O CONT         US-RENAL   Final Result      Unremarkable renal ultrasound.      IR-PERQ NEPH CATH NEW ACCESS (ALL RADIOLOGY) RIGHT   Final Result      1. ULTRASOUND AND FLUOROSCOPIC GUIDED PLACEMENT OF A RIGHT 8 [place]  PIGTAIL LOCKING LOOP PERCUTANEOUS NEPHROSTOMY CATHETER.      2. NEPHROSTOGRAM SHOWING SATISFACTORY CATHETER POSITION WITHOUT EXTRAVASATION. MODERATE RIGHT HYDRONEPHROSIS AND MILD RIGHT HYDROURETER.      DX-CHEST-PORTABLE (1 VIEW)   Final Result         1.  No focal infiltrates.   2.  Perihilar interstitial prominence and bronchial wall cuffing, appearance suggests changes of underlying bronchial inflammation, consider bronchitis.      OUTSIDE IMAGES-CT ABDOMEN /PELVIS   Final Result      OUTSIDE IMAGES-DX CHEST   Final Result           Assessment/Plan  * Sepsis (HCC)- (present on admission)  Assessment & Plan  This is Severe Sepsis Present on admission  SIRS criteria identified on my evaluation include: Fever, with temperature greater than 101 deg F, Tachycardia, with heart rate greater than 90 BPM, Tachypnea, with respirations greater than 20 per minute and Leukocytosis, with WBC greater than 12,000  Source is right-sided pyelonephritis  Clinical indicators of end organ dysfunction include Systolic blood pressure (SBP) <90 mmHg or mean arterial pressure <65 mmHg  Sepsis protocol initiated  Fluid resuscitation ordered per protocol  IV antibiotics as appropriate for source of sepsis  Reassessment: I have reassessed  the patient's hemodynamic status  End organ dysfunction include(s):  Acute respiratory failure and Acute kidney failure     SIRS 4/4 and qSOFA 2/3  Secondary to right-sided pyelonephritis from renal stone noted on outside facility CT  Continue IV hydration  Continue Rocephin antibiotic regimen  S/p R nephrostomy tube 6/14  Follow blood and urine cultures    Bacteremia  Assessment & Plan  MSSA Bacteremia  Pending ID recommendations for prolonged antibiotic course     DNR (do not resuscitate)- (present on admission)  Assessment & Plan  Had an extensive conversation with patient regarding CODE STATUS which patient reports that he would like to be DNR/DNI.  Patient voiced understanding and all questions were answered.    Lactic acidosis- (present on admission)  Assessment & Plan  3.8, 4.3  Map in the 60s  Aggressive IV hydration  Trend lactic acid    Pyelonephritis- (present on admission)  Assessment & Plan  Right-sided and noted at outside facility CT scan  Secondary to 3 mm stone  IV hydration  Continue IV antibiotics    Acute renal failure (ARF) (HCC)- (present on admission)  Assessment & Plan  Creatinine 1.8 at outside facility  Cr 2.4 here  Secondary to prerenal and postrenal etiologies (septic shock/nephrolithiasis)  Right pyelonephritis noted on outside facility abdominal/pelvic CT  Continue IV hydration for now  Avoid nephrotoxins  Renal dose meds  Trend creatinine       VTE prophylaxis: SCDs and ambulation

## 2021-06-16 NOTE — PROGRESS NOTES
[facility] called to report positive blood culture and urine cultures for staph. Documents will be faxed over.

## 2021-06-16 NOTE — PROGRESS NOTES
Received report from previous nurse, Kristina, regarding prior 12 hours.  POC reviewed with pt, white board updated, pt verbalized understanding, call light within reach.  Pt encouraged to call before getting up.  Bed in lowest position, treaded slippers on.

## 2021-06-16 NOTE — PROGRESS NOTES
Note to reader: this note follows the APSO format rather than the historical SOAP format. Assessment and plan located at the top of the note for ease of use.    Chief Complaint  81 y.o. year old male here with Fever and Flank Pain      Assessment/Plan  Interval History   Active Hospital Problems    Diagnosis    • Sepsis (HCC) [A41.9]    • Acute renal failure (ARF) (HCC) [N17.9]    • Pyelonephritis [N12]    • Lactic acidosis [E87.2]    • DNR (do not resuscitate) [Z66]      : Patient seen and examined    6/16.  No fever, chills, nausea or vomiting.  WBC unchanged. Cr improved.  No hematuria, dysuria, frequency or urgency    6/15.  No complaints.  Feeling better.  WBC up trending.  Cr improving.  Good UOP from RT NPT.      Disposition       - continue to strain urine  - Abx per culture results and hospital team  - Patient will need 2 weeks of appropriate abx before we can treat his stone  - Once medically cleared, he will be discharged home with NT until stone is treated in 2-3 weeks.  - Urology will sign off.  Call with questions.       Review of Systems  Physical Exam   Review of Systems   Constitutional: Negative for chills and fever.   Respiratory: Negative for cough.    Genitourinary: Negative for flank pain.   All other systems reviewed and are negative.    Vitals:    06/15/21 2026 06/16/21 0023 06/16/21 0419 06/16/21 0743   BP: 136/74 122/70 127/81 130/82   Pulse:  74 63 65   Resp:  18 17 18   Temp:  37.1 °C (98.7 °F) 37.6 °C (99.6 °F) 36.8 °C (98.3 °F)   TempSrc:  Temporal Temporal Temporal   SpO2:  94% 94% 95%   Weight:       Height:         Physical Exam  Vitals and nursing note reviewed.   Constitutional:       Appearance: Normal appearance.   Genitourinary:     Comments: NPT in RT flank draining clear yellow urine  Skin:     General: Skin is warm and dry.   Neurological:      Mental Status: He is alert.   Psychiatric:         Mood and Affect: Mood normal.         Judgment: Judgment normal.           Hematology Chemistry   Lab Results   Component Value Date/Time    WBC 27.0 (H) 06/16/2021 01:10 AM    HEMOGLOBIN 11.4 (L) 06/16/2021 01:10 AM    HEMATOCRIT 35.0 (L) 06/16/2021 01:10 AM    PLATELETCT 130 (L) 06/16/2021 01:10 AM     Lab Results   Component Value Date/Time    SODIUM 136 06/16/2021 01:10 AM    POTASSIUM 4.3 06/16/2021 01:10 AM    CHLORIDE 107 06/16/2021 01:10 AM    CO2 22 06/16/2021 01:10 AM    GLUCOSE 98 06/16/2021 01:10 AM    BUN 31 (H) 06/16/2021 01:10 AM    CREATININE 1.43 (H) 06/16/2021 01:10 AM         Labs not explicitly included in this progress note were reviewed by the author.   Radiology/imaging not explicitly included in this progress note was reviewed by the author.     Core Measures

## 2021-06-16 NOTE — PROGRESS NOTES
Received evening report from day RN. Pt is comfortable, and washed face with rag. Bed is locked in low position with call light and belongings within reach. POC discussed and all questions answered. All needs and requirements met at this time.

## 2021-06-16 NOTE — PROGRESS NOTES
[facility] Nephrology Daily Progress Note    Date of Service  6/16/2021    Chief Complaint  The patient is an 81-year-old male with history of benign prostatic hypertrophy, history of vasectomy, initially presented to [facility] Emergency Department on 06/13 with 1-day history of worsening right-sided   back pain with fever and chills.  This has been going on for about a month and resolved on its own.  He was found to have pyelonephritis.  His lactic acidosis was 3.8.  He has no history of kidney failure known per the patient.  He was transferred over to [facility] for further care for sepsis/pyelonephritis.  His blood pressures here were in the low 70s systolic to 96 systolic.  He was tachycardic.  He has been febrile here too on and off.  His BUN and creatinine on this admission was 39/2.45 with a bicarb of 17.  Nephrology hence was asked to see him for acute kidney injury.     Interval Problem Update  Daily Nephrology Summary:  6/15/21 - No new overnight renal events. Feels fine. Scr down to 1.68  6/16/21 - Feels ok. No new overnight renal events. Scr is 1.43. Non-oliguric.    Review of Systems  Review of Systems   Constitutional: Negative.    HENT: Negative.    Eyes: Negative.    Respiratory: Negative.    Cardiovascular: Negative.    Gastrointestinal: Negative.    Genitourinary: Negative.    Musculoskeletal: Negative.    Skin: Negative.    Neurological: Negative.    Endo/Heme/Allergies: Negative.    Psychiatric/Behavioral: Negative.         Physical Exam  Temp:  [36.8 °C (98.3 °F)-37.6 °C (99.6 °F)] 36.8 °C (98.3 °F)  Pulse:  [63-90] 65  Resp:  [17-18] 18  BP: (117-136)/(65-82) 130/82  SpO2:  [90 %-95 %] 95 %    Physical Exam  Vitals and nursing note reviewed.   Constitutional:       Appearance: Normal appearance.   HENT:      Head: Normocephalic and atraumatic.      Right Ear: Tympanic membrane normal.      Left Ear: Tympanic membrane normal.      Nose: Nose normal. No congestion.       Mouth/Throat:      Mouth: Mucous membranes are moist.      Pharynx: Oropharynx is clear.   Eyes:      Extraocular Movements: Extraocular movements intact.      Conjunctiva/sclera: Conjunctivae normal.      Pupils: Pupils are equal, round, and reactive to light.   Cardiovascular:      Rate and Rhythm: Normal rate and regular rhythm.   Pulmonary:      Effort: Pulmonary effort is normal.      Breath sounds: Normal breath sounds.   Abdominal:      General: Abdomen is flat.      Palpations: Abdomen is soft.   Musculoskeletal:         General: Normal range of motion.      Cervical back: Normal range of motion and neck supple.   Skin:     General: Skin is warm and dry.      Capillary Refill: Capillary refill takes less than 2 seconds.   Neurological:      Mental Status: He is alert.   Psychiatric:         Mood and Affect: Mood normal.         Behavior: Behavior normal.         Thought Content: Thought content normal.         Judgment: Judgment normal.         Fluids    Intake/Output Summary (Last 24 hours) at 6/16/2021 1030  Last data filed at 6/16/2021 0900  Gross per 24 hour   Intake 740 ml   Output 1400 ml   Net -660 ml       Laboratory  Recent Labs     06/14/21  0300 06/15/21  0142 06/16/21  0110   WBC 17.1* 28.6* 27.0*   RBC 3.84* 3.46* 3.47*   HEMOGLOBIN 12.6* 11.3* 11.4*   HEMATOCRIT 37.4* 34.7* 35.0*   MCV 97.4 100.3* 100.9*   MCH 32.8 32.7 32.9   MCHC 33.7 32.6* 32.6*   RDW 44.3 47.4 47.6   PLATELETCT 179 130* 130*   MPV 10.7 11.1 11.8     Recent Labs     06/14/21  0445 06/15/21  0142 06/16/21  0110   SODIUM 137 137 136   POTASSIUM 3.7 4.4 4.3   CHLORIDE 106 109 107   CO2 17* 21 22   GLUCOSE 123* 93 98   BUN 39* 33* 31*   CREATININE 2.45* 1.68* 1.43*   CALCIUM 8.2* 8.1* 8.1*     Recent Labs     06/14/21 0445   INR 1.45*     Recent Labs     06/14/21 0445   NTPROBNP 1676*             ASSESSMENT:  1.  Acute kidney injury, likely secondary to sepsis/hypoperfusion.  2.  Sepsis syndrome.  3.  Acidosis.  4.   Pyelonephritis.  5.  Anemia.     PLAN: Scr continues to trend in the right direction. CPM. Renally dose antibiotics and avoid nephrotoxins.  Keep mean arterial pressure greater than or equal to 65.

## 2021-06-16 NOTE — PROGRESS NOTES
Lab updated about + blood cultures, aerobic bottle. Not MRSA but staph origination. Informed via hosp on call service MD King would call back.    2220: MD Alves on rounds, informed of + blood cultures.    0130: Call from lab, 2nd set of aerobic bottle + cultures for staph.

## 2021-06-17 LAB
ANION GAP SERPL CALC-SCNC: 8 MMOL/L (ref 7–16)
BACTERIA FLD AEROBE CULT: ABNORMAL
BACTERIA FLD AEROBE CULT: ABNORMAL
BUN SERPL-MCNC: 27 MG/DL (ref 8–22)
CALCIUM SERPL-MCNC: 8.5 MG/DL (ref 8.5–10.5)
CHLORIDE SERPL-SCNC: 104 MMOL/L (ref 96–112)
CO2 SERPL-SCNC: 24 MMOL/L (ref 20–33)
CREAT SERPL-MCNC: 1.42 MG/DL (ref 0.5–1.4)
ERYTHROCYTE [DISTWIDTH] IN BLOOD BY AUTOMATED COUNT: 45.2 FL (ref 35.9–50)
GLUCOSE SERPL-MCNC: 113 MG/DL (ref 65–99)
GRAM STN SPEC: ABNORMAL
HCT VFR BLD AUTO: 34.7 % (ref 42–52)
HGB BLD-MCNC: 11.6 G/DL (ref 14–18)
MAGNESIUM SERPL-MCNC: 1.8 MG/DL (ref 1.5–2.5)
MCH RBC QN AUTO: 33.3 PG (ref 27–33)
MCHC RBC AUTO-ENTMCNC: 33.4 G/DL (ref 33.7–35.3)
MCV RBC AUTO: 99.7 FL (ref 81.4–97.8)
PHOSPHATE SERPL-MCNC: 3.4 MG/DL (ref 2.5–4.5)
PLATELET # BLD AUTO: 152 K/UL (ref 164–446)
PMV BLD AUTO: 11.6 FL (ref 9–12.9)
POTASSIUM SERPL-SCNC: 3.9 MMOL/L (ref 3.6–5.5)
RBC # BLD AUTO: 3.48 M/UL (ref 4.7–6.1)
SIGNIFICANT IND 70042: ABNORMAL
SITE SITE: ABNORMAL
SODIUM SERPL-SCNC: 136 MMOL/L (ref 135–145)
SOURCE SOURCE: ABNORMAL
WBC # BLD AUTO: 16.9 K/UL (ref 4.8–10.8)

## 2021-06-17 PROCEDURE — 80048 BASIC METABOLIC PNL TOTAL CA: CPT

## 2021-06-17 PROCEDURE — 97116 GAIT TRAINING THERAPY: CPT | Mod: CQ

## 2021-06-17 PROCEDURE — 36415 COLL VENOUS BLD VENIPUNCTURE: CPT

## 2021-06-17 PROCEDURE — 770020 HCHG ROOM/CARE - TELE (206)

## 2021-06-17 PROCEDURE — 84100 ASSAY OF PHOSPHORUS: CPT

## 2021-06-17 PROCEDURE — 700105 HCHG RX REV CODE 258: Performed by: STUDENT IN AN ORGANIZED HEALTH CARE EDUCATION/TRAINING PROGRAM

## 2021-06-17 PROCEDURE — 85027 COMPLETE CBC AUTOMATED: CPT

## 2021-06-17 PROCEDURE — 83735 ASSAY OF MAGNESIUM: CPT

## 2021-06-17 PROCEDURE — 700111 HCHG RX REV CODE 636 W/ 250 OVERRIDE (IP): Performed by: INTERNAL MEDICINE

## 2021-06-17 PROCEDURE — 99232 SBSQ HOSP IP/OBS MODERATE 35: CPT | Performed by: INTERNAL MEDICINE

## 2021-06-17 PROCEDURE — 99233 SBSQ HOSP IP/OBS HIGH 50: CPT | Performed by: INTERNAL MEDICINE

## 2021-06-17 PROCEDURE — 97530 THERAPEUTIC ACTIVITIES: CPT | Mod: CQ

## 2021-06-17 RX ADMIN — SODIUM CHLORIDE, POTASSIUM CHLORIDE, SODIUM LACTATE AND CALCIUM CHLORIDE: 600; 310; 30; 20 INJECTION, SOLUTION INTRAVENOUS at 15:08

## 2021-06-17 RX ADMIN — CEFAZOLIN SODIUM 2 G: 2 INJECTION, SOLUTION INTRAVENOUS at 15:08

## 2021-06-17 RX ADMIN — CEFAZOLIN SODIUM 2 G: 2 INJECTION, SOLUTION INTRAVENOUS at 05:09

## 2021-06-17 RX ADMIN — CEFAZOLIN SODIUM 2 G: 2 INJECTION, SOLUTION INTRAVENOUS at 21:58

## 2021-06-17 ASSESSMENT — ENCOUNTER SYMPTOMS
CONSTITUTIONAL NEGATIVE: 1
FLANK PAIN: 0
ABDOMINAL PAIN: 0
COUGH: 0
MYALGIAS: 0
GASTROINTESTINAL NEGATIVE: 1
DOUBLE VISION: 0
DEPRESSION: 0
HEARTBURN: 0
PALPITATIONS: 0
RESPIRATORY NEGATIVE: 1
SENSORY CHANGE: 0
PSYCHIATRIC NEGATIVE: 1
NAUSEA: 0
CARDIOVASCULAR NEGATIVE: 1
CHILLS: 0
BLURRED VISION: 0
FEVER: 0
EYES NEGATIVE: 1
BRUISES/BLEEDS EASILY: 0
BACK PAIN: 0
VOMITING: 0
HEADACHES: 0
DIZZINESS: 0
NEUROLOGICAL NEGATIVE: 1
HEMOPTYSIS: 0
MUSCULOSKELETAL NEGATIVE: 1
SORE THROAT: 0

## 2021-06-17 ASSESSMENT — COGNITIVE AND FUNCTIONAL STATUS - GENERAL
CLIMB 3 TO 5 STEPS WITH RAILING: A LITTLE
WALKING IN HOSPITAL ROOM: A LITTLE
TURNING FROM BACK TO SIDE WHILE IN FLAT BAD: A LITTLE
MOVING TO AND FROM BED TO CHAIR: A LITTLE
SUGGESTED CMS G CODE MODIFIER MOBILITY: CK
MOBILITY SCORE: 19
STANDING UP FROM CHAIR USING ARMS: A LITTLE

## 2021-06-17 ASSESSMENT — PAIN DESCRIPTION - PAIN TYPE
TYPE: ACUTE PAIN
TYPE: ACUTE PAIN

## 2021-06-17 ASSESSMENT — GAIT ASSESSMENTS
DISTANCE (FEET): 250
DEVIATION: BRADYKINETIC
ASSISTIVE DEVICE: FRONT WHEEL WALKER
GAIT LEVEL OF ASSIST: SUPERVISED

## 2021-06-17 ASSESSMENT — FIBROSIS 4 INDEX: FIB4 SCORE: 4.33

## 2021-06-17 NOTE — PROGRESS NOTES
Assumed care of patient @1915, beside report received from Emily LAWSON, Pt A&OX4 and denies any pain. Bed locked an lowered with call light in reach and questions answered in present time.

## 2021-06-17 NOTE — PROGRESS NOTES
Monitor Summary   Sinus Bradycardia/Sinus Rhythm  Rate 51-60  PVC Coup  .12/.10/.43    12 hour chart check

## 2021-06-17 NOTE — CARE PLAN
The patient is Watcher - Medium risk of patient condition declining or worsening    Shift Goals  Clinical Goals: titrate oxygen to room air. pain control   Patient Goals: pass kidney stone     Progress made toward(s) clinical / shift goals: Patient has rated his pain at 0/10 and remained free from falls or energy.    Patient is not progressing towards the following goals:Patient is still on 2 liters. Still hasn't passed his kidney stone.       Problem: Knowledge Deficit - Standard  Goal: Patient and family/care givers will demonstrate understanding of plan of care, disease process/condition, diagnostic tests and medications  Outcome: Progressing  Note: Patient updated on the plan of care. Patient educated on cefazolin and the need for antibiotics. Educated on WBC count. Educated on need for us to strain his urine to monitor for stones. Will continue to educate.      Problem: Urinary - Renal Perfusion  Goal: Ability to achieve and maintain adequate renal perfusion and functioning will improve  Outcome: Progressing  Note: Patient is urinating into the bedside urinal. He also has some urine draining from the neph tube. Will continue to monitor.

## 2021-06-17 NOTE — PROGRESS NOTES
Monitor summary:     0.14/0.09/0.42     Sinus Bradycardia/Sinus Rhythm 56 - 66     with PVCs, bigem, trigem, couplets    This RN reviewed the monitor strip in media tab.

## 2021-06-17 NOTE — PROGRESS NOTES
[facility] Nephrology Daily Progress Note    Date of Service  6/17/2021    Chief Complaint  The patient is an 81-year-old male with history of benign prostatic hypertrophy, history of vasectomy, initially presented to [facility] Emergency Department on 06/13 with 1-day history of worsening right-sided   back pain with fever and chills.  This has been going on for about a month and resolved on its own.  He was found to have pyelonephritis.  His lactic acidosis was 3.8.  He has no history of kidney failure known per the patient.  He was transferred over to [facility] for further care for sepsis/pyelonephritis.  His blood pressures here were in the low 70s systolic to 96 systolic.  He was tachycardic.  He has been febrile here too on and off.  His BUN and creatinine on this admission was 39/2.45 with a bicarb of 17.  Nephrology hence was asked to see him for acute kidney injury.     Interval Problem Update  Daily Nephrology Summary:  6/15/21 - No new overnight renal events. Feels fine. Scr down to 1.68  6/16/21 - Feels ok. No new overnight renal events. Scr is 1.43. Non-oliguric.  6/17/21 - Feels ok. Scr 1.42 stable. positive blood culture for MSSA     Review of Systems  Review of Systems   Constitutional: Negative.    HENT: Negative.    Eyes: Negative.    Respiratory: Negative.    Cardiovascular: Negative.    Gastrointestinal: Negative.    Genitourinary: Negative.    Musculoskeletal: Negative.    Skin: Negative.    Neurological: Negative.    Endo/Heme/Allergies: Negative.    Psychiatric/Behavioral: Negative.         Physical Exam  Temp:  [36.5 °C (97.7 °F)-37.2 °C (99 °F)] 37.2 °C (98.9 °F)  Pulse:  [57-63] 60  Resp:  [15-20] 20  BP: (133-163)/(77-83) 149/77  SpO2:  [95 %-96 %] 95 %    Physical Exam  Vitals and nursing note reviewed.   Constitutional:       Appearance: Normal appearance.   HENT:      Head: Normocephalic and atraumatic.      Right Ear: Tympanic membrane normal.      Left Ear: Tympanic  membrane normal.      Nose: Nose normal. No congestion.      Mouth/Throat:      Mouth: Mucous membranes are moist.      Pharynx: Oropharynx is clear.   Eyes:      Extraocular Movements: Extraocular movements intact.      Conjunctiva/sclera: Conjunctivae normal.      Pupils: Pupils are equal, round, and reactive to light.   Cardiovascular:      Rate and Rhythm: Normal rate and regular rhythm.   Pulmonary:      Effort: Pulmonary effort is normal.      Breath sounds: Normal breath sounds.   Abdominal:      General: Abdomen is flat.      Palpations: Abdomen is soft.   Musculoskeletal:         General: Normal range of motion.      Cervical back: Normal range of motion and neck supple.   Skin:     General: Skin is warm and dry.      Capillary Refill: Capillary refill takes less than 2 seconds.   Neurological:      Mental Status: He is alert.   Psychiatric:         Mood and Affect: Mood normal.         Behavior: Behavior normal.         Thought Content: Thought content normal.         Judgment: Judgment normal.         Fluids    Intake/Output Summary (Last 24 hours) at 6/17/2021 1109  Last data filed at 6/17/2021 0900  Gross per 24 hour   Intake 360 ml   Output 1050 ml   Net -690 ml       Laboratory  Recent Labs     06/15/21  0142 06/16/21  0110 06/17/21  0045   WBC 28.6* 27.0* 16.9*   RBC 3.46* 3.47* 3.48*   HEMOGLOBIN 11.3* 11.4* 11.6*   HEMATOCRIT 34.7* 35.0* 34.7*   .3* 100.9* 99.7*   MCH 32.7 32.9 33.3*   MCHC 32.6* 32.6* 33.4*   RDW 47.4 47.6 45.2   PLATELETCT 130* 130* 152*   MPV 11.1 11.8 11.6     Recent Labs     06/15/21  0142 06/16/21  0110 06/17/21  0045   SODIUM 137 136 136   POTASSIUM 4.4 4.3 3.9   CHLORIDE 109 107 104   CO2 21 22 24   GLUCOSE 93 98 113*   BUN 33* 31* 27*   CREATININE 1.68* 1.43* 1.42*   CALCIUM 8.1* 8.1* 8.5         No results for input(s): NTPROBNP in the last 72 hours.          ASSESSMENT:  1.  Acute kidney injury, likely secondary to sepsis/hypoperfusion.  2.  Sepsis syndrome.  3.   Acidosis.  4.  Pyelonephritis.  5.  MSSA  6.  Anemia     PLAN: Scr stable. ? Baseline. CPM. Renally dose antibiotics and avoid nephrotoxins.  Keep mean arterial pressure greater than or equal to 65.

## 2021-06-17 NOTE — CONSULTS
DATE OF SERVICE:  06/16/2021     INFECTIOUS DISEASE CONSULTATION     REASON FOR CONSULTATION:  Staph aureus bacteremia and complicated urinary   tract infection.     CONSULTING PHYSICIAN:  Klaus Bills MD     HISTORY OF PRESENT ILLNESS:  This is an 81-year-old male who was originally   admitted to the hospital on 06/14/2021 due to fever, worsening right-sided   back pain and chills.  Symptoms started approximately 1 day prior to   presenting to an outside facility in [facility].  He had similar pains 1 month   prior to that, but they resolved on its own.  He does have a history of prior   urinary tract infections, especially after his vasectomy.  He has known BPH.    In the ER at [facility], he was noted to be tachycardic, febrile, tachypneic and   requiring oxygen to maintain his saturation.  He was noted to have acute   kidney injury with a creatinine of 1.8 as well as lactic acidosis.  He is   started on ceftriaxone and transferred to [facility] for further evaluation and   management, particularly by urology.  He was seen and evaluated.  He had   nephrostomy tube placed with significant improvement in his back pain.    However, blood cultures are now showing Staph aureus.  Infectious disease   consulted for antibiotic recommendations and management.  He is currently on   ceftriaxone.  No sick contacts.     REVIEW OF SYSTEMS:  He is no longer having fever or chills.  His flank pain   has mostly resolved.  He is not having any chest pain or shortness of breath.    He denies any neck pain, back pain, joint pain.     PAST MEDICAL HISTORY:  BPH, vasectomy.     ALLERGIES: NKDA    FAMILY HISTORY:  Denied.     SOCIAL HISTORY:  Lives in [place].  He has no recent travel.  He does not   smoke, drink or use illicit drugs.  His only animal exposure is his pet dog.     PHYSICAL EXAMINATION:  GENERAL:  He is a well-nourished, well-developed male in no acute distress,   pleasant and cooperative, looks younger than his stated  age.  VITAL SIGNS:  He has been afebrile since admission.  Current temperature is   98.4, blood pressure 133/83, pulse 61, respiratory rate 16, oxygen saturation   95% on 2 liters nasal cannula.  He is 5 feet 11 inches, weighs 75 kilos.  HEENT:  Normocephalic, atraumatic.  Pupils equal, round, react to light.    Extraocular movements intact.  He has anicteric sclerae.  He has no   conjunctivitis.  Oropharynx is clear.  Mucous membranes are moist.  He has   good dentition.  NECK:  Supple.  There is no JVD or stridor.  CARDIOVASCULAR:  Regular rate and rhythm, unable to auscultate murmurs, rubs   or gallops.  CHEST:  Clear to auscultation bilaterally, unlabored.  There is no wheezing or   rhonchi.  ABDOMEN:  Soft, nontender, nondistended.  There is no rebound or guarding.  He   does have a nephrostomy tube in place.  There is no gross hematuria.  EXTREMITIES:  Show no cyanosis, clubbing or edema.  There is no joint   swelling.  SKIN:  Reveals ecchymoses.  No other rashes.  NEUROLOGIC:  He is awake, alert, oriented.  Speech is without dysarthria.    Cranial nerves are intact.     LABORATORY DATA:  Current labs show white blood cell count of 27,000, H and H   11.4 and 35, platelets 130.  Sodium 136, potassium 4.3, chloride 107,   bicarbonate 22, glucose 98, BUN 31, creatinine 1.43, which is improved from   prior reported 1.8.  His initial lactic acidosis of 4.3, is now down to 1.9.    Urinalysis showed trace protein, positive nitrite, trace leukocyte esterase,   10-20 WBCs, 10-20 RBCs.  Blood cultures x2 on 06/14 are positive for   methicillin-sensitive Staph aureus.  Urine culture results are also showing   MSSA.     DIAGNOSTIC DATA:  Chest x-ray showing no infiltrates, possible bronchitis.  He   did have a transthoracic echocardiogram, which is pending.  EKG showing QTc   of 468.     ASSESSMENT AND PLAN:  An 81-year-old male admitted as a transfer from outside   facility due to fever, right-sided back pain and chills.   He was found to have   obstructive uropathy with acute kidney injury.  He is status post nephrostomy   tube placement on 06/14.  He has had improvement in his urine outflow as well   as his creatinine.  However, both blood and urine cultures are now showing   methicillin-sensitive Staph aureus as methicillin-sensitive Staph aureus is   not a typical pathogen for urinary tract infection, suspect his prior   antibiotics as well as overgrowth of Staph will be obscuring the pathogen   associated with his urinary tract infection; however, he now needs workup for   source for his methicillin-sensitive Staph aureus.  Transthoracic   echocardiogram has been ordered.  He needs repeat blood cultures every 48   hours until negative.  If he has persistently positive blood cultures, he   should undergo transesophageal echocardiogram.  He currently does not have any   other foci of infection.  Continue to monitor for development of neck pain,   back pain, joint pain.  We will discontinue ceftriaxone and switch to IV   cefazolin for better treatment of methicillin-sensitive Staph aureus.  Once   his blood cultures are negative, he will require midline placement and 4 weeks   of IV antibiotic therapy.  Arrangements may be problematic given the patient   is a resident of [place] and not [place].  This will be per case management.    Discussed with hospitalist.     Thank you and we will follow with you.        ______________________________  MD EV Wick/EDEN    DD:  06/16/2021 13:45  DT:  06/16/2021 18:03    Job#:  [id number]

## 2021-06-17 NOTE — PROGRESS NOTES
Assumed care of patient, bedside report received from RENNY Oconnor. Updated on POC, call light within reach and fall precautions in place. Bed locked and in lowest position. Patient instructed to call for assistance before getting out of bed. All questions answered, no other needs at this time.

## 2021-06-17 NOTE — THERAPY
Physical Therapy   Daily Treatment     Patient Name: Alex Lawson  Age:  81 y.o., Sex:  male  Medical Record #: [id number]  Today's Date: 6/17/2021     Precautions: Fall Risk    Assessment    Pt pleasant and grateful for therapy. He was able to progress with increased activity today. He continues to present with impairments in strength and low activity tolerance. He was receptive to education on energy conservation and performing short bursts of activity more frequently especially here in acute setting. He completed bed mobility with spv and hob slightly elevated. He performed multiple standing dynamic balance activities with no lob at this time. Transfers he only required vc for reaching back for a slow and controlled descend and performed good carryover throughout. He increased gait distance, no knee buckling or lob. He performed with fww and slow steady pace. He completed 6 steps with hands on strictly for safety using bilateral hand rails but no lob. Encouraged to continue to follow while in house.     Plan    Continue current treatment plan.    DC Equipment Recommendations: None  Discharge Recommendations: Anticipate that the patient will have no further physical therapy needs after discharge from the hospital         06/17/21 4774   Other Treatments   Other Treatments Provided standing dynamic balance with close SBA but no overt lob at this time. Educated on eenrgy conservation and increasing mobility here in acute setting    Balance   Sitting Balance (Static) Fair   Sitting Balance (Dynamic) Fair   Standing Balance (Static) Good   Standing Balance (Dynamic) Fair +   Weight Shift Sitting Fair   Weight Shift Standing Fair   Comments with fww    Gait Analysis   Gait Level Of Assist Supervised   Assistive Device Front Wheel Walker   Distance (Feet) 250   # of Times Distance was Traveled 1   Deviation Bradykinetic   # of Stairs Climbed 6   Level of Assist with Stairs   (cga)   Skilled Intervention Verbal  Cuing;Compensatory Strategies;Sequencing   Comments Pt improved gait with no knee buckling or lob at this time. Slow steady pace with fww ans spv. Educated on short walks more frequenty. He was able to complete 6 steps with CGA for safety but no overt lob, just due to first time attempting.    Bed Mobility    Supine to Sit Supervised   Sit to Supine   (left up in chair )   Scooting Supervised   Comments hob slightly elevated    Functional Mobility   Sit to Stand Supervised   Bed, Chair, Wheelchair Transfer Supervised   Toilet Transfers Supervised   Skilled Intervention Verbal Cuing;Compensatory Strategies;Sequencing   Comments spv with fww, did require vc for reaching back for a slow and controlled descend   Short Term Goals    Short Term Goal # 1 Pt will perform bed mobility with flat bed, no rail with mod indep in 6 visits.   Goal Outcome # 1 Progressing as expected   Short Term Goal # 2 Pt will transfer with mod indep in 6 visits to improve functional indep.   Goal Outcome # 2 Goal not met   Short Term Goal # 3 Pt will ambulate x 200 feet using no AD with supervision in 6 visits to improve functional indep.    Goal Outcome # 3 Goal not met   Short Term Goal # 4 Pt will go up/down 1 flight stairs with supervision in 6 visits to access full home.    Goal Outcome # 4 Goal not met

## 2021-06-17 NOTE — PROGRESS NOTES
Infectious Disease Progress Note    Author: Orquidea Stephenson M.D. Date & Time of service: 2021  2:17 PM    Chief Complaint:  Staph aureus bacteremia and complicated urinary   tract infection.      Interval History:  81-year-old male admitted to the hospital on 2021 due to fever, worsening right-sided   back pain and chills that started approximately 1 day prior to presenting to an outside facility in [facility]   AF WBC 16.9 feels better-tolerating abx. Asking about when can be discharged    Labs Reviewed and Medications Reviewed.    Review of Systems:  Review of Systems   Constitutional: Negative for chills and fever.   Gastrointestinal: Negative for abdominal pain, nausea and vomiting.   Genitourinary: Negative for dysuria, flank pain and hematuria.   All other systems reviewed and are negative.      Hemodynamics:  Temp (24hrs), Av.9 °C (98.5 °F), Min:36.5 °C (97.7 °F), Max:37.2 °C (99 °F)  Temperature: 37.2 °C (99 °F)  Pulse  Av.3  Min: 57  Max: 100   Blood Pressure : 155/88       Physical Exam:  Physical Exam  Vitals and nursing note reviewed.   Constitutional:       General: He is not in acute distress.     Appearance: He is not ill-appearing or toxic-appearing.   HENT:      Nose: No rhinorrhea.      Mouth/Throat:      Pharynx: No oropharyngeal exudate.   Eyes:      General: No scleral icterus.     Extraocular Movements: Extraocular movements intact.      Pupils: Pupils are equal, round, and reactive to light.   Cardiovascular:      Rate and Rhythm: Normal rate.   Pulmonary:      Effort: Pulmonary effort is normal. No respiratory distress.      Breath sounds: No stridor.   Abdominal:      General: There is no distension.      Tenderness: There is no abdominal tenderness.   Genitourinary:     Comments: NFT  Musculoskeletal:      Cervical back: Neck supple. No rigidity.   Skin:     Coloration: Skin is not jaundiced.      Findings: Bruising present.   Neurological:      General: No focal  deficit present.      Mental Status: He is alert and oriented to person, place, and time.   Psychiatric:         Mood and Affect: Mood normal.         Behavior: Behavior normal.         Meds:    Current Facility-Administered Medications:   •  ceFAZolin  •  LR  •  senna-docusate **AND** polyethylene glycol/lytes **AND** magnesium hydroxide **AND** bisacodyl  •  enalaprilat  •  labetalol  •  ondansetron  •  ondansetron  •  LR  •  acetaminophen  •  midodrine    Labs:  Recent Labs     06/15/21  0142 06/16/21  0110 06/17/21  0045   WBC 28.6* 27.0* 16.9*   RBC 3.46* 3.47* 3.48*   HEMOGLOBIN 11.3* 11.4* 11.6*   HEMATOCRIT 34.7* 35.0* 34.7*   .3* 100.9* 99.7*   MCH 32.7 32.9 33.3*   RDW 47.4 47.6 45.2   PLATELETCT 130* 130* 152*   MPV 11.1 11.8 11.6     Recent Labs     06/15/21  0142 06/16/21  0110 06/17/21  0045   SODIUM 137 136 136   POTASSIUM 4.4 4.3 3.9   CHLORIDE 109 107 104   CO2 21 22 24   GLUCOSE 93 98 113*   BUN 33* 31* 27*     Recent Labs     06/15/21  0142 06/16/21  0110 06/17/21  0045   CREATININE 1.68* 1.43* 1.42*       Imaging:  DX-CHEST-PORTABLE (1 VIEW)    Result Date: 6/14/2021 6/14/2021 3:23 AM HISTORY/REASON FOR EXAM: Sepsis; sepsis TECHNIQUE/EXAM DESCRIPTION:  Single AP view of the chest. COMPARISON: None FINDINGS: Overlying cardiac leads are present. The cardiac silhouette appears within normal limits. The mediastinal contour appears within normal limits.  Bilateral perihilar interstitial prominence and bronchial wall cuffing is noted. The lungs appear well expanded bilaterally.  Bilateral lungs are clear. No significant pleural effusions are identified. The bony structures appear age-appropriate.     1.  No focal infiltrates. 2.  Perihilar interstitial prominence and bronchial wall cuffing, appearance suggests changes of underlying bronchial inflammation, consider bronchitis.    US-RENAL    Result Date: 6/14/2021 6/14/2021 11:41 AM HISTORY/REASON FOR EXAM:  Abnormal Labs; s/p nephrostomy  tube R kidney for pyelonephritis TECHNIQUE/EXAM DESCRIPTION: Renal ultrasound. COMPARISON:  None FINDINGS: The right kidney measures 11.23 cm. The left kidney measures 9.06 cm. There is no hydronephrosis. There are no abnormal calcifications. The bladder is decompressed around a Bashir catheter.     Unremarkable renal ultrasound.    EC-ECHOCARDIOGRAM COMPLETE W/O CONT    Result Date: 2021  Transthoracic Echo Report Echocardiography Laboratory CONCLUSIONS No prior study is available for comparison. The left ventricle was normal in size and function. Left ventricular ejection fraction is visually estimated to be 60%. Normal diastolic function. The right ventricle was normal in size and function. Mild mitral regurgitation. CHARANJIT SANDERS Exam Date:         2021                    10:02 Exam Location:     Inpatient Priority:          Routine Ordering Physician:        NI PERERA Referring Physician:       NI PERERA Sonographer:               Manuel Monterroso [facility],                            RVT Age:    81     Gender:    M MRN:    [id number] :    1940 BSA:    1.89   Ht (in):    71     Wt (lb):    155 Exam Type:     Complete Indications:     Shortness of breath ICD Codes:       786.05 CPT Codes:       60812 BP:   82     /   49     HR:   63 Technical Quality:       Fair MEASUREMENTS  (Male / Female) Normal Values 2D ECHO LV Diastolic Diameter PLAX        5 cm                  4.2 - 5.9 / 3.9 - 5.3 cm LV Systolic Diameter PLAX         3.2 cm                2.1 - 4.0 cm IVS Diastolic Thickness           1 cm                  LVPW Diastolic Thickness          0.83 cm               LVOT Diameter                     1.9 cm                Estimated LV Ejection Fraction    60 %                  LV Ejection Fraction MOD BP       65.4 %                >= 55  % LV Ejection Fraction MOD 4C       61.7 %                LV Ejection Fraction  MOD 2C       71.8 %                DOPPLER AV Peak Velocity                  1.3 m/s               AV Peak Gradient                  6.5 mmHg              AV Mean Gradient                  4 mmHg                LVOT Peak Velocity                1 m/s                 AV Area Cont Eq vti               1.8 cm2               Mitral E Point Velocity           0.64 m/s              Mitral E to A Ratio               0.84                  MV Pressure Half Time             75.7 ms               MV Area PHT                       2.9 cm2               MV Deceleration Time              261 ms                * Indicates values subject to auto-interpretation LV EF:  60    % FINDINGS Left Ventricle The left ventricle was normal in size and function.  Normal left ventricular wall thickness. Left ventricular ejection fraction is visually estimated to be 60%. Normal regional wall motion. Normal diastolic function. Right Ventricle The right ventricle was normal in size and function. Right Atrium Enlarged right atrium. Dilated inferior vena cava without inspiratory collapse. Left Atrium The left atrium is normal in size.  Left atrial volume index is 27  mL/sq m. Mitral Valve Structurally normal mitral valve without significant stenosis. Mild mitral regurgitation. Aortic Valve Structurally normal aortic valve without significant stenosis or regurgitation. Tricuspid Valve Structurally normal tricuspid valve without significant stenosis. Mild tricuspid regurgitation. Right atrial pressure is estimated to be 15 mmHg. Unable to estimate pulmonary artery pressure due to an inadequate tricuspid regurgitant jet. Pulmonic Valve Structurally normal pulmonic valve without significant stenosis or regurgitation. Pericardium Normal pericardium without effusion. Aorta The aortic root is normal.  Ascending aorta diameter is 3.5 cm. Donnie Reina MD (Electronically Signed) Final Date:     16 June 2021                 16:08    IR-PERQ NEPH CATH  NEW ACCESS (ALL RADIOLOGY) RIGHT    Result Date: 6/14/2021 6/14/2021 5:08 AM HISTORY/REASON FOR EXAM:  Urolithiasis. Acute right renal obstruction with sepsis, hypotension, right-sided hydronephrosis. Renal insufficiency, creatinine 2.45. Atrophic left kidney. TECHNIQUE/EXAM DESCRIPTION: RIGHT Percutaneous Nephrostomy and Nephrostogram with ultrasound and fluoroscopic guidance. PROCEDURE: Informed consent was obtained. The skin was prepped and draped in a sterile fashion using chlorhexidine antiseptic. Moderate sedation was provided. Pulse oximetry and continuous cardiac monitoring by the nurse was performed throughout the exam. Intraservice time was 30 minutes. CURRENT ALARA PRINCIPLES FOR DOSE REDUCTION TECHNIQUES WERE UTILIZED FOR THIS EXAMINATION. FLUOROSCOPY TIME: 1.5 minutes NUMBER OF FILMS: 8 fluoroscopic frame capture images and/or digital series obtained. CONTRAST AMOUNT: 5 mL Omnipaque The RIGHT kidney was localized with real time ultrasound. This demonstrated moderate hydronephrosis Following local anesthesia with 1% lidocaine, an 18 G needle with a rivera tip stylet was advanced into the renal collecting system at the right lower pole calyx under real time ultrasound guidance. Urine returned through the puncture needle and an 8 mL specimen was collected and submitted for culture and sensitivity, Gram stain, and cell count. An antegrade pyelogram with minimal contrast injection confirmed puncture entry into the right lower pole calyx. An angled glide wire was advanced into the renal pelvis and down the right ureter and following serial tract dilatation, an 8 Croatian locking loop pigtail nephrostomy catheter was placed and reformed in the renal pelvis. The locking loop was secured and a nephrostogram obtained documenting satisfactory catheter position with all side holes within the collecting system. The catheter was secured to the skin and connected to a gravity drainage bag. The patient tolerated the  "procedure well with no evidence of complication. COMPARISON:  CT of the abdomen and pelvis, outside films 6/13/2021 FINDINGS:  The nephrostogram shows the catheter to be in satisfactory position. There is no extravasation. There is moderate right hydronephrosis and mild right hydroureter.     1. ULTRASOUND AND FLUOROSCOPIC GUIDED PLACEMENT OF A RIGHT 8 Bulgarian  PIGTAIL LOCKING LOOP PERCUTANEOUS NEPHROSTOMY CATHETER. 2. NEPHROSTOGRAM SHOWING SATISFACTORY CATHETER POSITION WITHOUT EXTRAVASATION. MODERATE RIGHT HYDRONEPHROSIS AND MILD RIGHT HYDROURETER.      Micro:  Results     Procedure Component Value Units Date/Time    BLOOD CULTURE [[id number]]  (Abnormal) Collected: 06/14/21 0300    Order Status: Completed Specimen: Blood from Peripheral Updated: 06/17/21 1351     Significant Indicator POS     Source BLD     Site PERIPHERAL     Culture Result Growth detected by Bactec instrument. 06/16/2021  01:30  Gram Stain: Gram positive cocci: Possible Staphylococcus sp.        Staphylococcus aureus  See previous culture for sensitivity report.      Narrative:      CALL  [place]  183 tel. [phone],  CALLED  183 tel. [phone] 06/16/2021, 01:32, RB PERF. RESULTS CALLED TO: RN  [phone]  Per Hospital Policy: Only change Specimen Src: to \"Line\" if  specified by physician order.  No site indicated    BLOOD CULTURE [[id number]]  (Abnormal) Collected: 06/14/21 0355    Order Status: Completed Specimen: Blood from Peripheral Updated: 06/17/21 1350     Significant Indicator POS     Source BLD     Site PERIPHERAL     Culture Result Growth detected by Bactec instrument. 06/15/2021  21:20  Gram Stain: Gram positive cocci: Possible Staphylococcus sp.  Staphylococcus aureus (methicillin sensitive)  detected by PCR.  Susceptibility to follow.        Staphylococcus aureus  Susceptibilities in progress      Narrative:      Per Hospital Policy: Only change Specimen Src: to \"Line\" if  specified by physician order.  No site indicated    FLUID CULTURE " "W/GRAM STAIN [[id number]]  (Abnormal)  (Susceptibility) Collected: 06/14/21 0540    Order Status: Completed Specimen: Other Body Fluid Updated: 06/17/21 0822     Significant Indicator POS     Source BF     Site right kidney urine     Culture Result Growth noted after further incubation, see below for  organism identification.       Gram Stain Result Few WBCs.  Few Gram positive cocci.       Culture Result Staphylococcus aureus  Light growth      Narrative:      8mL aspirated from right kidney by Dr. Carvalho  Results to Miguel MENCHACA Sepsis, Pyelonephritis, Nephrolithiasis, ARF  Urine Test:->Random  8mL aspirated from right kidney by Dr. Carvalho    Susceptibility     Staphylococcus aureus (1)     Antibiotic Interpretation Microscan Method Status    Azithromycin Sensitive <=2 mcg/mL HASMUKH Final    Clindamycin Sensitive <=0.25 mcg/mL HASMUKH Final    Cefazolin Sensitive <=8 mcg/mL HASMUKH Final    Cefepime Sensitive <=4 mcg/mL HASMUKH Final    Ceftaroline Sensitive <=0.5 mcg/mL HASMUKH Final    Daptomycin Sensitive <=0.5 mcg/mL HASMUKH Final    Erythromycin Sensitive <=0.25 mcg/mL HASMUKH Final    Ampicillin/sulbactam Sensitive <=8/4 mcg/mL HASMUKH Final    Vancomycin Sensitive <=0.25 mcg/mL HASMUKH Final    Oxacillin Sensitive <=0.25 mcg/mL HASMUKH Final    Pip/Tazobactam Sensitive <=8 mcg/mL HASMUKH Final    Trimeth/Sulfa Sensitive <=0.5/9.5 mcg/mL HASMUKH Final    Tetracycline Sensitive <=4 mcg/mL HASMUKH Final                   BLOOD CULTURE [[id number]] Collected: 06/16/21 1003    Order Status: Completed Specimen: Blood from Peripheral Updated: 06/17/21 0752     Significant Indicator NEG     Source BLD     Site PERIPHERAL     Culture Result No Growth  Note: Blood cultures are incubated for 5 days and  are monitored continuously.Positive blood cultures  are called to the RN and reported as soon as  they are identified.      Narrative:      Per Hospital Policy: Only change Specimen Src: to \"Line\" if  specified by physician order.  No site indicated    BLOOD CULTURE " "[[id number]]     Order Status: Sent Specimen: Blood from Peripheral     URINE CULTURE(NEW) [[id number]] Collected: 06/14/21 0445    Order Status: Completed Specimen: Urine Updated: 06/16/21 0824     Significant Indicator NEG     Source UR     Site -     Culture Result Usual skin abner 10-50,000 cfu/mL    Narrative:      Indication for culture:->Emergency Room Patient  Have you been in close contact with a person who is suspected  or known to be positive for COVID-19 within the last 30 days  (e.g. last seen that person < 30 days ago)->No  Indication for culture:->Emergency Room Patient    GRAM STAIN [[id number]] Collected: 06/14/21 0540    Order Status: Completed Specimen: Body Fluid Updated: 06/14/21 1825     Significant Indicator .     Source BF     Site right kidney urine     Gram Stain Result Few WBCs.  Few Gram positive cocci.      Narrative:      8mL aspirated from right kidney by Dr. Carvalho  Results to Miguel MENCHACA Sepsis, Pyelonephritis, Nephrolithiasis, ARF  Urine Test:->Random  8mL aspirated from right kidney by Dr. Carvalho    SARS-CoV-2 PCR (24 hour In-House): Collect NP swab in VTM [[id number]] Collected: 06/14/21 0400    Order Status: Completed Specimen: Respirate Updated: 06/14/21 1528     SARS-CoV-2 Source NP Swab     SARS-CoV-2 by PCR NotDetected     Comment: PATIENTS: Important information regarding your results and instructions can  be found at [email or web address]   \"After your  Covid-19 Test\"    [facility] providers: PLEASE REFER TO DE-ESCALATION AND RETESTING PROTOCOL  on [email or web address]    **The TaqPath COVID-19 SARS-CoV-2 RT-PCR test has been made available for  use under the Emergency Use Authorization (EUA) only.         Narrative:      Have you been in close contact with a person who is suspected  or known to be positive for COVID-19 within the last 30 days  (e.g. last seen that person < 30 days ago)->No    URINALYSIS [[id number]]  (Abnormal) Collected: 06/14/21 0445    " Order Status: Completed Specimen: Urine Updated: 06/14/21 0548     Color Yellow     Character Clear     Specific Gravity 1.013     Ph 7.5     Glucose Negative mg/dL      Ketones Negative mg/dL      Protein 30 mg/dL      Bilirubin Negative     Urobilinogen, Urine 0.2     Nitrite Positive     Leukocyte Esterase Trace     Occult Blood Moderate     Micro Urine Req Microscopic    Narrative:      Indication for culture:->Emergency Room Patient  Have you been in close contact with a person who is suspected  or known to be positive for COVID-19 within the last 30 days  (e.g. last seen that person < 30 days ago)->No          Assessment:  Active Hospital Problems    Diagnosis    • *Sepsis (HCC) [A41.9]    • Bacteremia [R78.81]    • Acute renal failure (ARF) (HCC) [N17.9]    • Pyelonephritis [N12]    • Lactic acidosis [E87.2]    • DNR (do not resuscitate) [Z66]        Plan:  MSSA sepsis  Fever resolved  Leukocytosis decreased  FREDDY improved  BCxs + MSSA 6/14  Repeat Bcxs every 48 hours until neg-Bcxs 6/16 neg at 24 hours  Continue to monitor for development of neck pain, back pain, joint pain.  Midline when Bcxs neg 48 hours  TTE unrevealing; recommend MIMA if persistently +Bcxs  Anticipate 4 weeks IV abx from date of negative blood cxs  Continue IV cefazolin for now  Arrangements for home abx may be problematic given the patient is a resident of [place] and not [place].      Obstructive uropathy  Acute kidney injury-unknown baseline.  Pyelonephritis  Kidney culture +MSSA  s/p nephrostomy tube placement on 06/14.  Dose adjust abx as needed     Discussed with Hosp Dr Bills

## 2021-06-17 NOTE — PROGRESS NOTES
[facility] Medicine Daily Progress Note    Date of Service  6/17/2021    Chief Complaint  81 y.o. male admitted 6/14/2021 with flank pain  Hospital Course    81 y.o. male with a past medical history of BPH status post vasectomy years ago presented to the [facility] emergency department on 6/13/2021 with 1 day history of worsening right-sided back pain, fever and chills.  Patient stating that he has had pain similar to such a month ago that resolved on its own.  He does have a history of untreated UTIs.  Patient denies any urinary frequency, dysuria or straining on urination.  At the [facility] emergency department, Vital signs with tachycardia at 120, 39 °C fever, tachypnea in the 20s.  Patient requiring 4 L nasal cannula to maintain saturation.  CBC without leukocytosis or anemia.  Chemistry with acute renal failure creatinine 1.8 and BUN 36.  Lactic acidosis at 3.8. Blood samples were collected for culture.  He received a 2 L bolus and was a start Rocephin regimen antibiotic regimen.       Patient was transferred to [facility] for sepsis secondary to pyonephritis. At the emergency department, vital signs with tachycardia in the 110s, tachypnea in the 20s, and hypotension with map at 63.  Patient was started on sepsis bolus and blood samples were collected for lab.  EDP as well as myself discussed case with Urology (Dr. Owens), who requested  tube placement by interventional radiology.  I discussed case with interventional radiology (Dr. Carvalho), who will evaluate case for nephrostomy tube placement indication.  Repeat CBC with leukocytosis at 17.1 with elevated polys and normocytic anemia with hemoglobin 12.6.  Patient was admitted to telemetry for sepsis secondary to pyelonephritis which required IV antibiotics.       Interval Problem Update    6/15/2021: The patient was seen and evaluated at bedside and appears comfortable.  Patient underwent placement of nephrostomy tube yesterday courtesy of interventional radiology.  At this  time patient's creatinine level is continuing to improve.  He does have some postoperative leukocytosis which we will continue to monitor.  Urology with current recommendations to continue nephrostomy tube as well as antibiotic therapy for at least 2 weeks prior to definitive intervention for kidney stone.  At this time we are pending final urine culture prior to discharge.  Patient denies any chest pains, palpitations, shortness of breath.  States that he is tolerating oral intake and is ambulating independently.  Pain is currently well controlled.  No other overnight events reported.    6/16/2021: Patient noted to have positive blood culture for MSSA.  Pending final cultures and sensitivities.  ID has been consulted and appreciate their recommendations.  Pending repeat blood cultures.  At this time patient remains comfortable.  Denies any chest pains, palpitations, shortness of breath.  No other overnight events reported.    6/17/2021: Patient continues to show interval improvement throughout the course of his stay.  Current recommendations from ID are to continue IV cefazolin for at least 4 weeks for MSSA bacteremia.  Case management currently assisting in finding placement.  Urology to evaluate patient for possible intervention on nephrolithiasis after 2 weeks of antibiotics.  Pending repeat blood cultures.  Transthoracic echo without any vegetations.  Patient is tolerating oral intake and ambulating dependently.  No other overnight events reported.    Consultants/Specialty  Nephrology  Urology    Code Status  DNAR/DNI    Disposition  Pending final urine culture report    Review of Systems  Review of Systems   Constitutional: Negative for chills and fever.   HENT: Negative for hearing loss, sore throat and tinnitus.    Eyes: Negative for blurred vision and double vision.   Respiratory: Negative for cough and hemoptysis.    Cardiovascular: Negative for chest pain and palpitations.   Gastrointestinal: Negative for  heartburn and nausea.   Genitourinary: Negative for dysuria and urgency.   Musculoskeletal: Negative for back pain and myalgias.   Skin: Negative for itching and rash.   Neurological: Negative for dizziness, sensory change and headaches.   Endo/Heme/Allergies: Does not bruise/bleed easily.   Psychiatric/Behavioral: Negative for depression and suicidal ideas.        Physical Exam  Temp:  [36.5 °C (97.7 °F)-37.2 °C (99 °F)] 37.2 °C (99 °F)  Pulse:  [57-66] 66  Resp:  [15-20] 18  BP: (135-163)/(77-88) 155/88  SpO2:  [95 %-96 %] 96 %    Physical Exam  Vitals reviewed.   Constitutional:       Appearance: Normal appearance.   HENT:      Head: Normocephalic and atraumatic.      Nose: No congestion or rhinorrhea.      Mouth/Throat:      Mouth: Mucous membranes are moist.      Pharynx: Oropharynx is clear.   Eyes:      Extraocular Movements: Extraocular movements intact.      Conjunctiva/sclera: Conjunctivae normal.      Pupils: Pupils are equal, round, and reactive to light.   Cardiovascular:      Rate and Rhythm: Normal rate and regular rhythm.      Heart sounds: No murmur heard.   No friction rub. No gallop.    Pulmonary:      Effort: Pulmonary effort is normal.      Breath sounds: Normal breath sounds.   Abdominal:      General: Abdomen is flat. Bowel sounds are normal.      Palpations: Abdomen is soft.   Musculoskeletal:         General: No swelling, tenderness or deformity.      Cervical back: Neck supple. No rigidity. No muscular tenderness.   Lymphadenopathy:      Cervical: No cervical adenopathy.   Skin:     General: Skin is warm and dry.      Findings: No erythema or rash.   Neurological:      General: No focal deficit present.      Mental Status: He is alert and oriented to person, place, and time. Mental status is at baseline.   Psychiatric:         Mood and Affect: Mood normal.         Behavior: Behavior normal.         Thought Content: Thought content normal.         Fluids    Intake/Output Summary (Last 24  hours) at 6/17/2021 1431  Last data filed at 6/17/2021 0900  Gross per 24 hour   Intake 360 ml   Output 1050 ml   Net -690 ml       Laboratory  Recent Labs     06/15/21  0142 06/16/21  0110 06/17/21  0045   WBC 28.6* 27.0* 16.9*   RBC 3.46* 3.47* 3.48*   HEMOGLOBIN 11.3* 11.4* 11.6*   HEMATOCRIT 34.7* 35.0* 34.7*   .3* 100.9* 99.7*   MCH 32.7 32.9 33.3*   MCHC 32.6* 32.6* 33.4*   RDW 47.4 47.6 45.2   PLATELETCT 130* 130* 152*   MPV 11.1 11.8 11.6     Recent Labs     06/15/21  0142 06/16/21  0110 06/17/21  0045   SODIUM 137 136 136   POTASSIUM 4.4 4.3 3.9   CHLORIDE 109 107 104   CO2 21 22 24   GLUCOSE 93 98 113*   BUN 33* 31* 27*   CREATININE 1.68* 1.43* 1.42*   CALCIUM 8.1* 8.1* 8.5                   Imaging  EC-ECHOCARDIOGRAM COMPLETE W/O CONT   Final Result      US-RENAL   Final Result      Unremarkable renal ultrasound.      IR-PERQ NEPH CATH NEW ACCESS (ALL RADIOLOGY) RIGHT   Final Result      1. ULTRASOUND AND FLUOROSCOPIC GUIDED PLACEMENT OF A RIGHT 8 Malay  PIGTAIL LOCKING LOOP PERCUTANEOUS NEPHROSTOMY CATHETER.      2. NEPHROSTOGRAM SHOWING SATISFACTORY CATHETER POSITION WITHOUT EXTRAVASATION. MODERATE RIGHT HYDRONEPHROSIS AND MILD RIGHT HYDROURETER.      DX-CHEST-PORTABLE (1 VIEW)   Final Result         1.  No focal infiltrates.   2.  Perihilar interstitial prominence and bronchial wall cuffing, appearance suggests changes of underlying bronchial inflammation, consider bronchitis.      OUTSIDE IMAGES-CT ABDOMEN /PELVIS   Final Result      OUTSIDE IMAGES-DX CHEST   Final Result           Assessment/Plan  * Sepsis (HCC)- (present on admission)  Assessment & Plan  This is Severe Sepsis Present on admission  SIRS criteria identified on my evaluation include: Fever, with temperature greater than 101 deg F, Tachycardia, with heart rate greater than 90 BPM, Tachypnea, with respirations greater than 20 per minute and Leukocytosis, with WBC greater than 12,000  Source is right-sided pyelonephritis  Clinical  indicators of end organ dysfunction include Systolic blood pressure (SBP) <90 mmHg or mean arterial pressure <65 mmHg  Sepsis protocol initiated  Fluid resuscitation ordered per protocol  IV antibiotics as appropriate for source of sepsis  Reassessment: I have reassessed the patient's hemodynamic status  End organ dysfunction include(s):  Acute respiratory failure and Acute kidney failure     SIRS 4/4 and qSOFA 2/3  Secondary to right-sided pyelonephritis from renal stone noted on outside facility CT  Continue IV hydration  Continue Rocephin antibiotic regimen  S/p R nephrostomy tube 6/14  Follow blood and urine cultures    Bacteremia  Assessment & Plan  MSSA Bacteremia  Pending ID recommendations for prolonged antibiotic course     DNR (do not resuscitate)- (present on admission)  Assessment & Plan  Had an extensive conversation with patient regarding CODE STATUS which patient reports that he would like to be DNR/DNI.  Patient voiced understanding and all questions were answered.    Lactic acidosis- (present on admission)  Assessment & Plan  3.8, 4.3  Map in the 60s  Aggressive IV hydration  Trend lactic acid    Pyelonephritis- (present on admission)  Assessment & Plan  Right-sided and noted at outside facility CT scan  Secondary to 3 mm stone  IV hydration  Continue IV antibiotics    Acute renal failure (ARF) (HCC)- (present on admission)  Assessment & Plan  Creatinine 1.8 at outside facility  Cr 2.4 here  Secondary to prerenal and postrenal etiologies (septic shock/nephrolithiasis)  Right pyelonephritis noted on outside facility abdominal/pelvic CT  Continue IV hydration for now  Avoid nephrotoxins  Renal dose meds  Trend creatinine       VTE prophylaxis: SCDs and ambulation

## 2021-06-18 ENCOUNTER — APPOINTMENT (OUTPATIENT)
Dept: RADIOLOGY | Facility: MEDICAL CENTER | Age: 81
DRG: 871 | End: 2021-06-18
Attending: INTERNAL MEDICINE
Payer: MEDICARE

## 2021-06-18 LAB
ANION GAP SERPL CALC-SCNC: 10 MMOL/L (ref 7–16)
BACTERIA BLD CULT: ABNORMAL
BUN SERPL-MCNC: 21 MG/DL (ref 8–22)
CALCIUM SERPL-MCNC: 8.2 MG/DL (ref 8.5–10.5)
CHLORIDE SERPL-SCNC: 104 MMOL/L (ref 96–112)
CO2 SERPL-SCNC: 24 MMOL/L (ref 20–33)
CREAT SERPL-MCNC: 1.07 MG/DL (ref 0.5–1.4)
ERYTHROCYTE [DISTWIDTH] IN BLOOD BY AUTOMATED COUNT: 43.2 FL (ref 35.9–50)
GLUCOSE SERPL-MCNC: 110 MG/DL (ref 65–99)
HCT VFR BLD AUTO: 34.3 % (ref 42–52)
HGB BLD-MCNC: 11.6 G/DL (ref 14–18)
MAGNESIUM SERPL-MCNC: 1.7 MG/DL (ref 1.5–2.5)
MCH RBC QN AUTO: 32.8 PG (ref 27–33)
MCHC RBC AUTO-ENTMCNC: 33.8 G/DL (ref 33.7–35.3)
MCV RBC AUTO: 96.9 FL (ref 81.4–97.8)
PHOSPHATE SERPL-MCNC: 3.6 MG/DL (ref 2.5–4.5)
PLATELET # BLD AUTO: 151 K/UL (ref 164–446)
PMV BLD AUTO: 10.6 FL (ref 9–12.9)
POTASSIUM SERPL-SCNC: 3.6 MMOL/L (ref 3.6–5.5)
RBC # BLD AUTO: 3.54 M/UL (ref 4.7–6.1)
SIGNIFICANT IND 70042: ABNORMAL
SIGNIFICANT IND 70042: ABNORMAL
SITE SITE: ABNORMAL
SITE SITE: ABNORMAL
SODIUM SERPL-SCNC: 138 MMOL/L (ref 135–145)
SOURCE SOURCE: ABNORMAL
SOURCE SOURCE: ABNORMAL
WBC # BLD AUTO: 11.3 K/UL (ref 4.8–10.8)

## 2021-06-18 PROCEDURE — 36415 COLL VENOUS BLD VENIPUNCTURE: CPT

## 2021-06-18 PROCEDURE — 83735 ASSAY OF MAGNESIUM: CPT

## 2021-06-18 PROCEDURE — 99232 SBSQ HOSP IP/OBS MODERATE 35: CPT | Performed by: INTERNAL MEDICINE

## 2021-06-18 PROCEDURE — 76937 US GUIDE VASCULAR ACCESS: CPT

## 2021-06-18 PROCEDURE — 770020 HCHG ROOM/CARE - TELE (206)

## 2021-06-18 PROCEDURE — 700105 HCHG RX REV CODE 258: Performed by: STUDENT IN AN ORGANIZED HEALTH CARE EDUCATION/TRAINING PROGRAM

## 2021-06-18 PROCEDURE — 700111 HCHG RX REV CODE 636 W/ 250 OVERRIDE (IP): Performed by: INTERNAL MEDICINE

## 2021-06-18 PROCEDURE — 05HC33Z INSERTION OF INFUSION DEVICE INTO LEFT BASILIC VEIN, PERCUTANEOUS APPROACH: ICD-10-PCS | Performed by: INTERNAL MEDICINE

## 2021-06-18 PROCEDURE — 80048 BASIC METABOLIC PNL TOTAL CA: CPT

## 2021-06-18 PROCEDURE — 84100 ASSAY OF PHOSPHORUS: CPT

## 2021-06-18 PROCEDURE — 85027 COMPLETE CBC AUTOMATED: CPT

## 2021-06-18 RX ADMIN — CEFAZOLIN SODIUM 2 G: 2 INJECTION, SOLUTION INTRAVENOUS at 14:28

## 2021-06-18 RX ADMIN — CEFAZOLIN SODIUM 2 G: 2 INJECTION, SOLUTION INTRAVENOUS at 21:05

## 2021-06-18 RX ADMIN — SODIUM CHLORIDE, POTASSIUM CHLORIDE, SODIUM LACTATE AND CALCIUM CHLORIDE: 600; 310; 30; 20 INJECTION, SOLUTION INTRAVENOUS at 17:52

## 2021-06-18 RX ADMIN — SODIUM CHLORIDE, POTASSIUM CHLORIDE, SODIUM LACTATE AND CALCIUM CHLORIDE: 600; 310; 30; 20 INJECTION, SOLUTION INTRAVENOUS at 02:19

## 2021-06-18 RX ADMIN — CEFAZOLIN SODIUM 2 G: 2 INJECTION, SOLUTION INTRAVENOUS at 05:17

## 2021-06-18 ASSESSMENT — ENCOUNTER SYMPTOMS
BLURRED VISION: 0
MYALGIAS: 0
PSYCHIATRIC NEGATIVE: 1
BRUISES/BLEEDS EASILY: 0
BACK PAIN: 0
CHILLS: 0
HEADACHES: 0
SENSORY CHANGE: 0
CONSTITUTIONAL NEGATIVE: 1
SORE THROAT: 0
NEUROLOGICAL NEGATIVE: 1
NAUSEA: 0
MUSCULOSKELETAL NEGATIVE: 1
PND: 0
RESPIRATORY NEGATIVE: 1
LOSS OF CONSCIOUSNESS: 0
CARDIOVASCULAR NEGATIVE: 1
HEMOPTYSIS: 0
ABDOMINAL PAIN: 0
EYES NEGATIVE: 1
SEIZURES: 0
DIZZINESS: 0
FEVER: 0
PALPITATIONS: 0
GASTROINTESTINAL NEGATIVE: 1
FLANK PAIN: 0
BLOOD IN STOOL: 0
DEPRESSION: 0
WHEEZING: 0
HEARTBURN: 0
FALLS: 0
VOMITING: 0
DOUBLE VISION: 0
COUGH: 0

## 2021-06-18 ASSESSMENT — PAIN DESCRIPTION - PAIN TYPE: TYPE: ACUTE PAIN

## 2021-06-18 NOTE — DISCHARGE PLANNING
Anticipated Discharge Disposition: home with outpatient infusions    Action: Patient discussed in IDT rounds. He will need four weeks of IV abx. He has no PT/OT needs.  [facility] [place] to [facility] as Dr. Stephenson recommending Ertapenem, 1 gm daily until 7/14/201, but she cannot prescribe in [place]. Patient lives in [place], [place], which is [place] miles away from [facility]. Dimas,  with the [facility], said that the community care worker, Kristel, has already reached out to patient's PCP, Dr. Cho, to notify them that patient will need outpatient infusions and referral for antibiotics was sent. They will find a place near patient for him to continue his infusions and notify patient once that is complete. RENNY MONTALVO asked that Dimas send message to Kristel to keep this RN CM updated as patient is hoping to discharge home as soon as able. RN CM updated patient and his spouse, Sharon.    Barriers to Discharge: PICC placed, outpatient infusions arranged    Plan: Case coordination to [facility] with [facility] regarding [facility]

## 2021-06-18 NOTE — PROGRESS NOTES
[facility] Nephrology Daily Progress Note    Date of Service  6/18/2021    Chief Complaint  The patient is an 81-year-old male with history of benign prostatic hypertrophy, history of vasectomy, initially presented to [facility] Emergency Department on 06/13 with 1-day history of worsening right-sided   back pain with fever and chills.  This has been going on for about a month and resolved on its own.  He was found to have pyelonephritis.  His lactic acidosis was 3.8.  He has no history of kidney failure known per the patient.  He was transferred over to [facility] for further care for sepsis/pyelonephritis.  His blood pressures here were in the low 70s systolic to 96 systolic.  He was tachycardic.  He has been febrile here too on and off.  His BUN and creatinine on this admission was 39/2.45 with a bicarb of 17.  Nephrology hence was asked to see him for acute kidney injury.     Interval Problem Update  Daily Nephrology Summary:  6/15/21 - No new overnight renal events. Feels fine. Scr down to 1.68  6/16/21 - Feels ok. No new overnight renal events. Scr is 1.43. Non-oliguric.  6/17/21 - Feels ok. Scr 1.42 stable. positive blood culture for MSSA   6/18/21 - Scr is now back to baseline levels. Feels ok.    Review of Systems  Review of Systems   Constitutional: Negative.    HENT: Negative.    Eyes: Negative.    Respiratory: Negative.    Cardiovascular: Negative.    Gastrointestinal: Negative.    Genitourinary: Negative.    Musculoskeletal: Negative.    Skin: Negative.    Neurological: Negative.    Endo/Heme/Allergies: Negative.    Psychiatric/Behavioral: Negative.         Physical Exam  Temp:  [36.6 °C (97.9 °F)-37.2 °C (99 °F)] 37.2 °C (98.9 °F)  Pulse:  [62-74] 71  Resp:  [17-18] 18  BP: (137-155)/(79-88) 149/86  SpO2:  [92 %-97 %] 92 %    Physical Exam  Vitals and nursing note reviewed.   Constitutional:       Appearance: Normal appearance.   HENT:      Head: Normocephalic and atraumatic.      Right  Ear: Tympanic membrane normal.      Left Ear: Tympanic membrane normal.      Nose: Nose normal. No congestion.      Mouth/Throat:      Mouth: Mucous membranes are moist.      Pharynx: Oropharynx is clear.   Eyes:      Extraocular Movements: Extraocular movements intact.      Conjunctiva/sclera: Conjunctivae normal.      Pupils: Pupils are equal, round, and reactive to light.   Cardiovascular:      Rate and Rhythm: Normal rate and regular rhythm.   Pulmonary:      Effort: Pulmonary effort is normal.      Breath sounds: Normal breath sounds.   Abdominal:      General: Abdomen is flat.      Palpations: Abdomen is soft.   Musculoskeletal:         General: Normal range of motion.      Cervical back: Normal range of motion and neck supple.   Skin:     General: Skin is warm and dry.      Capillary Refill: Capillary refill takes less than 2 seconds.   Neurological:      Mental Status: He is alert.   Psychiatric:         Mood and Affect: Mood normal.         Behavior: Behavior normal.         Thought Content: Thought content normal.         Judgment: Judgment normal.         Fluids    Intake/Output Summary (Last 24 hours) at 6/18/2021 1042  Last data filed at 6/18/2021 0900  Gross per 24 hour   Intake 240 ml   Output 1325 ml   Net -1085 ml       Laboratory  Recent Labs     06/16/21  0110 06/17/21  0045 06/18/21  0122   WBC 27.0* 16.9* 11.3*   RBC 3.47* 3.48* 3.54*   HEMOGLOBIN 11.4* 11.6* 11.6*   HEMATOCRIT 35.0* 34.7* 34.3*   .9* 99.7* 96.9   MCH 32.9 33.3* 32.8   MCHC 32.6* 33.4* 33.8   RDW 47.6 45.2 43.2   PLATELETCT 130* 152* 151*   MPV 11.8 11.6 10.6     Recent Labs     06/16/21  0110 06/17/21  0045 06/18/21  0122   SODIUM 136 136 138   POTASSIUM 4.3 3.9 3.6   CHLORIDE 107 104 104   CO2 22 24 24   GLUCOSE 98 113* 110*   BUN 31* 27* 21   CREATININE 1.43* 1.42* 1.07   CALCIUM 8.1* 8.5 8.2*         No results for input(s): NTPROBNP in the last 72 hours.          ASSESSMENT:  1.  Acute kidney injury, likely secondary  to sepsis/hypoperfusion.  2.  Sepsis syndrome.  3.  Acidosis.  4.  Pyelonephritis.  5.  MSSA  6.  Anemia     PLAN: Scr stable and at baseline. Renally dose antibiotics and avoid nephrotoxins.  Keep mean arterial pressure greater than or equal to 65.  We will sign off the case please re-consult as needed.

## 2021-06-18 NOTE — PROCEDURES
Vascular Access Team    Date of Insertion: 6/18/21  Arm Circumference: 30  Internal length: 12  External Length: 0  Vein Occupancy %: 43  Reason for Midline: Antibiotic therapy  Labs: WBC 11.3, , BUN 21, Cr 1.07, GFR >60, INR NA    Orders confirmed, vessel patency confirmed with ultrasound. Risks and benefits of procedure explained to patient and education regarding line associated bloodstream infections provided. Questions answered.     Power Midline placed in LUE per licensed provider order with ultrasound guidance. 4 Fr, Single lumen Power Midline placed in Bacilic vein after 1 attempt(s). 2 mL of 1% lidocaine injected intradermally, 21 gauge microintroducer needle and modified Seldinger technique used. 12 cm catheter inserted with good blood return. Secured at 0 cm marker. Each lumen flushed without resistance with 10 mL 0.9% normal saline. Midline secured with Biopatch and Tegaderm.     Midline placement is confirmed by nurse using ultrasound and ability to flush and draw blood. Midline is appropriate for use at this time.  Patient tolerated procedure well, without complications.  No X-ray is needed for placement confirmation.  Patient condition relayed to unit RN or ordering physician via this post procedure note in the EMR.     Ultrasound images uploaded to PACS and viewable in the EMR - yes  Ultrasound imaged printed and placed in paper chart - no     BARD Power Midline ref # [id number], Lot # [id number], Expiration Date 3/31/22

## 2021-06-18 NOTE — CARE PLAN
The patient is Stable - Low risk of patient condition declining or worsening    Shift Goals  Clinical Goals: titrate oxygen to room air. pain control   Patient Goals: pass kidney stone     Progress made toward(s) clinical / shift goals: Patient oxygen is now down from 2 liters to .5. He reports no pain. Small amounts of sediment in strainer.     Patient is not progressing towards the following goals: N/A      Problem: Knowledge Deficit - Standard  Goal: Patient and family/care givers will demonstrate understanding of plan of care, disease process/condition, diagnostic tests and medications  Outcome: Progressing  Note: Patient updated on the plan of care and the continued use of the antibiotic cefazolin. Educated that WBC count has dropped from 27 to 16.9 indicating the antibiotics are effective. Waiting to see if he can continue antibiotics outpatient. Will continue to update.       Problem: Physical Regulation  Goal: Signs and symptoms of infection will decrease  6/17/2021 2219 by Elvin Willoughby RJENNIFER.  Outcome: Progressing  Note: Patients is actively taking antibiotics and WBC count down from 27-16.9. Educated on hand hygiene.   6/17/2021 2215 by Elvin Willoughby RJenaeN.  Outcome: Progressing

## 2021-06-18 NOTE — CARE PLAN
The patient is Stable - Low risk of patient condition declining or worsening    Shift Goals  Clinical Goals: titrate oxygen to room air. Get midline placed.   Patient Goals: pass kidney stone     Progress made toward(s) clinical / shift goals: patient trailing room air. Midline placed.     Patient is not progressing towards the following goals:na      Problem: Knowledge Deficit - Standard  Goal: Patient and family/care givers will demonstrate understanding of plan of care, disease process/condition, diagnostic tests and medications.   Discussed POC. Social work and RN have updated wife and daughter.patient aware that discharge is not possible today due to need for long term antibiotics and having to coordinate this in [place] for him.   Outcome: Progressing     Problem: Fluid Volume  Goal: Fluid volume balance will be maintained  Patient voiding with out difficultly. Patient encouraged to orally hydrate.   Outcome: Progressing

## 2021-06-18 NOTE — PROGRESS NOTES
Assumed care of patient @1915, beside report received from Elinor LAWSON, Pt A&OX4 and denies any pain. Bed locked an lowered with call light in reach and questions answered in present time.

## 2021-06-18 NOTE — PROGRESS NOTES
Infectious Disease Progress Note    Author: Orquidea Stephenson M.D. Date & Time of service: 2021  12:37 PM    Chief Complaint:  Staph aureus bacteremia and complicated urinary   tract infection.      Interval History:  81-year-old male admitted to the hospital on 2021 due to fever, worsening right-sided   back pain and chills that started approximately 1 day prior to presenting to an outside facility in [facility]   AF WBC 16.9 feels better-tolerating abx. Asking about when can be discharged   AF WBC 11.3 feels OK-no new complaints    Labs Reviewed and Medications Reviewed.    Review of Systems:  Review of Systems   Constitutional: Negative for chills and fever.   Gastrointestinal: Negative for abdominal pain, nausea and vomiting.   Genitourinary: Negative for dysuria, flank pain and hematuria.   All other systems reviewed and are negative.      Hemodynamics:  Temp (24hrs), Av °C (98.6 °F), Min:36.6 °C (97.9 °F), Max:37.2 °C (99 °F)  Temperature: 37.1 °C (98.7 °F)  Pulse  Av.8  Min: 57  Max: 100   Blood Pressure : 152/75       Physical Exam:  Physical Exam  Vitals and nursing note reviewed.   Constitutional:       General: He is not in acute distress.     Appearance: He is not ill-appearing or toxic-appearing.   HENT:      Nose: No rhinorrhea.      Mouth/Throat:      Pharynx: No oropharyngeal exudate.   Eyes:      General: No scleral icterus.     Extraocular Movements: Extraocular movements intact.      Pupils: Pupils are equal, round, and reactive to light.   Cardiovascular:      Rate and Rhythm: Normal rate.   Pulmonary:      Effort: Pulmonary effort is normal. No respiratory distress.      Breath sounds: No stridor.   Abdominal:      General: There is no distension.      Tenderness: There is no abdominal tenderness.   Genitourinary:     Comments: NFT  Musculoskeletal:      Cervical back: Neck supple. No rigidity.   Skin:     Coloration: Skin is not jaundiced.      Findings: Bruising  present.   Neurological:      General: No focal deficit present.      Mental Status: He is alert and oriented to person, place, and time.   Psychiatric:         Mood and Affect: Mood normal.         Behavior: Behavior normal.         Meds:    Current Facility-Administered Medications:   •  ceFAZolin  •  LR  •  senna-docusate **AND** polyethylene glycol/lytes **AND** magnesium hydroxide **AND** bisacodyl  •  enalaprilat  •  labetalol  •  ondansetron  •  ondansetron  •  LR  •  acetaminophen    Labs:  Recent Labs     06/16/21  0110 06/17/21  0045 06/18/21  0122   WBC 27.0* 16.9* 11.3*   RBC 3.47* 3.48* 3.54*   HEMOGLOBIN 11.4* 11.6* 11.6*   HEMATOCRIT 35.0* 34.7* 34.3*   .9* 99.7* 96.9   MCH 32.9 33.3* 32.8   RDW 47.6 45.2 43.2   PLATELETCT 130* 152* 151*   MPV 11.8 11.6 10.6     Recent Labs     06/16/21  0110 06/17/21  0045 06/18/21  0122   SODIUM 136 136 138   POTASSIUM 4.3 3.9 3.6   CHLORIDE 107 104 104   CO2 22 24 24   GLUCOSE 98 113* 110*   BUN 31* 27* 21     Recent Labs     06/16/21  0110 06/17/21  0045 06/18/21  0122   CREATININE 1.43* 1.42* 1.07       Imaging:  DX-CHEST-PORTABLE (1 VIEW)    Result Date: 6/14/2021 6/14/2021 3:23 AM HISTORY/REASON FOR EXAM: Sepsis; sepsis TECHNIQUE/EXAM DESCRIPTION:  Single AP view of the chest. COMPARISON: None FINDINGS: Overlying cardiac leads are present. The cardiac silhouette appears within normal limits. The mediastinal contour appears within normal limits.  Bilateral perihilar interstitial prominence and bronchial wall cuffing is noted. The lungs appear well expanded bilaterally.  Bilateral lungs are clear. No significant pleural effusions are identified. The bony structures appear age-appropriate.     1.  No focal infiltrates. 2.  Perihilar interstitial prominence and bronchial wall cuffing, appearance suggests changes of underlying bronchial inflammation, consider bronchitis.    US-RENAL    Result Date: 6/14/2021 6/14/2021 11:41 AM HISTORY/REASON FOR EXAM:   Abnormal Labs; s/p nephrostomy tube R kidney for pyelonephritis TECHNIQUE/EXAM DESCRIPTION: Renal ultrasound. COMPARISON:  None FINDINGS: The right kidney measures 11.23 cm. The left kidney measures 9.06 cm. There is no hydronephrosis. There are no abnormal calcifications. The bladder is decompressed around a Bashir catheter.     Unremarkable renal ultrasound.    EC-ECHOCARDIOGRAM COMPLETE W/O CONT    Result Date: 2021  Transthoracic Echo Report Echocardiography Laboratory CONCLUSIONS No prior study is available for comparison. The left ventricle was normal in size and function. Left ventricular ejection fraction is visually estimated to be 60%. Normal diastolic function. The right ventricle was normal in size and function. Mild mitral regurgitation. CHARANJIT SANDERS Exam Date:         2021                    10:02 Exam Location:     Inpatient Priority:          Routine Ordering Physician:        NI PERERA Referring Physician:       NI PERERA Sonographer:               Manuel Monterroso RDCS,                            RVT Age:    81     Gender:    M MRN:    [id number] :    1940 BSA:    1.89   Ht (in):    71     Wt (lb):    155 Exam Type:     Complete Indications:     Shortness of breath ICD Codes:       786.05 CPT Codes:       95267 BP:   82     /   49     HR:   63 Technical Quality:       Fair MEASUREMENTS  (Male / Female) Normal Values 2D ECHO LV Diastolic Diameter PLAX        5 cm                  4.2 - 5.9 / 3.9 - 5.3 cm LV Systolic Diameter PLAX         3.2 cm                2.1 - 4.0 cm IVS Diastolic Thickness           1 cm                  LVPW Diastolic Thickness          0.83 cm               LVOT Diameter                     1.9 cm                Estimated LV Ejection Fraction    60 %                  LV Ejection Fraction MOD BP       65.4 %                >= 55  % LV Ejection Fraction MOD 4C       61.7 %                 LV Ejection Fraction MOD 2C       71.8 %                DOPPLER AV Peak Velocity                  1.3 m/s               AV Peak Gradient                  6.5 mmHg              AV Mean Gradient                  4 mmHg                LVOT Peak Velocity                1 m/s                 AV Area Cont Eq vti               1.8 cm2               Mitral E Point Velocity           0.64 m/s              Mitral E to A Ratio               0.84                  MV Pressure Half Time             75.7 ms               MV Area PHT                       2.9 cm2               MV Deceleration Time              261 ms                * Indicates values subject to auto-interpretation LV EF:  60    % FINDINGS Left Ventricle The left ventricle was normal in size and function.  Normal left ventricular wall thickness. Left ventricular ejection fraction is visually estimated to be 60%. Normal regional wall motion. Normal diastolic function. Right Ventricle The right ventricle was normal in size and function. Right Atrium Enlarged right atrium. Dilated inferior vena cava without inspiratory collapse. Left Atrium The left atrium is normal in size.  Left atrial volume index is 27  mL/sq m. Mitral Valve Structurally normal mitral valve without significant stenosis. Mild mitral regurgitation. Aortic Valve Structurally normal aortic valve without significant stenosis or regurgitation. Tricuspid Valve Structurally normal tricuspid valve without significant stenosis. Mild tricuspid regurgitation. Right atrial pressure is estimated to be 15 mmHg. Unable to estimate pulmonary artery pressure due to an inadequate tricuspid regurgitant jet. Pulmonic Valve Structurally normal pulmonic valve without significant stenosis or regurgitation. Pericardium Normal pericardium without effusion. Aorta The aortic root is normal.  Ascending aorta diameter is 3.5 cm. Donnie Reina MD (Electronically Signed) Final Date:     16 June 2021                  16:08    IR-PERQ NEPH CATH NEW ACCESS (ALL RADIOLOGY) RIGHT    Result Date: 6/14/2021 6/14/2021 5:08 AM HISTORY/REASON FOR EXAM:  Urolithiasis. Acute right renal obstruction with sepsis, hypotension, right-sided hydronephrosis. Renal insufficiency, creatinine 2.45. Atrophic left kidney. TECHNIQUE/EXAM DESCRIPTION: RIGHT Percutaneous Nephrostomy and Nephrostogram with ultrasound and fluoroscopic guidance. PROCEDURE: Informed consent was obtained. The skin was prepped and draped in a sterile fashion using chlorhexidine antiseptic. Moderate sedation was provided. Pulse oximetry and continuous cardiac monitoring by the nurse was performed throughout the exam. Intraservice time was 30 minutes. CURRENT ALARA PRINCIPLES FOR DOSE REDUCTION TECHNIQUES WERE UTILIZED FOR THIS EXAMINATION. FLUOROSCOPY TIME: 1.5 minutes NUMBER OF FILMS: 8 fluoroscopic frame capture images and/or digital series obtained. CONTRAST AMOUNT: 5 mL Omnipaque The RIGHT kidney was localized with real time ultrasound. This demonstrated moderate hydronephrosis Following local anesthesia with 1% lidocaine, an 18 G needle with a rivera tip stylet was advanced into the renal collecting system at the right lower pole calyx under real time ultrasound guidance. Urine returned through the puncture needle and an 8 mL specimen was collected and submitted for culture and sensitivity, Gram stain, and cell count. An antegrade pyelogram with minimal contrast injection confirmed puncture entry into the right lower pole calyx. An angled glide wire was advanced into the renal pelvis and down the right ureter and following serial tract dilatation, an 8 Frisian locking loop pigtail nephrostomy catheter was placed and reformed in the renal pelvis. The locking loop was secured and a nephrostogram obtained documenting satisfactory catheter position with all side holes within the collecting system. The catheter was secured to the skin and connected to a gravity drainage bag.  "The patient tolerated the procedure well with no evidence of complication. COMPARISON:  CT of the abdomen and pelvis, outside films 6/13/2021 FINDINGS:  The nephrostogram shows the catheter to be in satisfactory position. There is no extravasation. There is moderate right hydronephrosis and mild right hydroureter.     1. ULTRASOUND AND FLUOROSCOPIC GUIDED PLACEMENT OF A RIGHT 8 [place]  PIGTAIL LOCKING LOOP PERCUTANEOUS NEPHROSTOMY CATHETER. 2. NEPHROSTOGRAM SHOWING SATISFACTORY CATHETER POSITION WITHOUT EXTRAVASATION. MODERATE RIGHT HYDRONEPHROSIS AND MILD RIGHT HYDROURETER.      Micro:  Results     Procedure Component Value Units Date/Time    BLOOD CULTURE [[id number]]  (Abnormal) Collected: 06/14/21 0300    Order Status: Completed Specimen: Blood from Peripheral Updated: 06/18/21 0814     Significant Indicator POS     Source BLD     Site PERIPHERAL     Culture Result Growth detected by Bactec instrument. 06/16/2021  01:30      Staphylococcus aureus  See previous culture for sensitivity report.      Narrative:      CALL  [place]  183 tel. [phone],  CALLED  183 tel. [phone] 06/16/2021, 01:32, RB PERF. RESULTS CALLED TO: RN  [phone]  Per Hospital Policy: Only change Specimen Src: to \"Line\" if  specified by physician order.  No site indicated    BLOOD CULTURE [[id number]]  (Abnormal)  (Susceptibility) Collected: 06/14/21 0355    Order Status: Completed Specimen: Blood from Peripheral Updated: 06/18/21 0813     Significant Indicator POS     Source BLD     Site PERIPHERAL     Culture Result Growth detected by Bactec instrument. 06/15/2021  21:20  Staphylococcus aureus (methicillin sensitive)  detected by PCR.        Staphylococcus aureus    Narrative:      Per Hospital Policy: Only change Specimen Src: to \"Line\" if  specified by physician order.  No site indicated    Susceptibility     Staphylococcus aureus (1)     Antibiotic Interpretation Microscan Method Status    Azithromycin Sensitive <=2 mcg/mL HASMUKH Final    " Clindamycin Sensitive <=0.25 mcg/mL HASMUKH Final    Cefazolin Sensitive <=8 mcg/mL HASMUKH Final    Cefepime Sensitive <=4 mcg/mL HASMUKH Final    Ceftaroline Sensitive <=0.5 mcg/mL HASMUKH Final    Daptomycin Sensitive <=0.5 mcg/mL HASMUKH Final    Erythromycin Sensitive <=0.25 mcg/mL HASMUKH Final    Ampicillin/sulbactam Sensitive <=8/4 mcg/mL HASMUKH Final    Vancomycin Sensitive <=0.25 mcg/mL HASMUKH Final    Oxacillin Sensitive <=0.25 mcg/mL HASMUKH Final    Pip/Tazobactam Sensitive <=8 mcg/mL HASMUKH Final    Trimeth/Sulfa Sensitive <=0.5/9.5 mcg/mL HASMUKH Final    Tetracycline Sensitive <=4 mcg/mL HASMUKH Final                   FLUID CULTURE W/GRAM STAIN [[id number]]  (Abnormal)  (Susceptibility) Collected: 06/14/21 0540    Order Status: Completed Specimen: Other Body Fluid Updated: 06/17/21 0822     Significant Indicator POS     Source BF     Site right kidney urine     Culture Result Growth noted after further incubation, see below for  organism identification.       Gram Stain Result Few WBCs.  Few Gram positive cocci.       Culture Result Staphylococcus aureus  Light growth      Narrative:      8mL aspirated from right kidney by Dr. Carvalho  Results to Miguel Valera   Sepsis, Pyelonephritis, Nephrolithiasis, ARF  Urine Test:->Random  8mL aspirated from right kidney by Dr. Carvalho    Susceptibility     Staphylococcus aureus (1)     Antibiotic Interpretation Microscan Method Status    Azithromycin Sensitive <=2 mcg/mL HASMUKH Final    Clindamycin Sensitive <=0.25 mcg/mL HASMUKH Final    Cefazolin Sensitive <=8 mcg/mL HASMUKH Final    Cefepime Sensitive <=4 mcg/mL HASMUKH Final    Ceftaroline Sensitive <=0.5 mcg/mL HASMUKH Final    Daptomycin Sensitive <=0.5 mcg/mL HASMUKH Final    Erythromycin Sensitive <=0.25 mcg/mL HASMUKH Final    Ampicillin/sulbactam Sensitive <=8/4 mcg/mL HASMUKH Final    Vancomycin Sensitive <=0.25 mcg/mL HASMUKH Final    Oxacillin Sensitive <=0.25 mcg/mL HASMUKH Final    Pip/Tazobactam Sensitive <=8 mcg/mL HASMUKH Final    Trimeth/Sulfa Sensitive <=0.5/9.5 mcg/mL HASMUKH Final   "   Tetracycline Sensitive <=4 mcg/mL HASMUKH Final                   BLOOD CULTURE [[id number]] Collected: 06/16/21 1003    Order Status: Completed Specimen: Blood from Peripheral Updated: 06/17/21 0752     Significant Indicator NEG     Source BLD     Site PERIPHERAL     Culture Result No Growth  Note: Blood cultures are incubated for 5 days and  are monitored continuously.Positive blood cultures  are called to the RN and reported as soon as  they are identified.      Narrative:      Per Hospital Policy: Only change Specimen Src: to \"Line\" if  specified by physician order.  No site indicated    BLOOD CULTURE [[id number]]     Order Status: Sent Specimen: Blood from Peripheral     URINE CULTURE(NEW) [[id number]] Collected: 06/14/21 0445    Order Status: Completed Specimen: Urine Updated: 06/16/21 0824     Significant Indicator NEG     Source UR     Site -     Culture Result Usual skin abner 10-50,000 cfu/mL    Narrative:      Indication for culture:->Emergency Room Patient  Have you been in close contact with a person who is suspected  or known to be positive for COVID-19 within the last 30 days  (e.g. last seen that person < 30 days ago)->No  Indication for culture:->Emergency Room Patient    GRAM STAIN [[id number]] Collected: 06/14/21 0540    Order Status: Completed Specimen: Body Fluid Updated: 06/14/21 1825     Significant Indicator .     Source BF     Site right kidney urine     Gram Stain Result Few WBCs.  Few Gram positive cocci.      Narrative:      8mL aspirated from right kidney by Dr. Carvalho  Results to Miguel MENCHACA Sepsis, Pyelonephritis, Nephrolithiasis, ARF  Urine Test:->Random  8mL aspirated from right kidney by Dr. Carvalho    SARS-CoV-2 PCR (24 hour In-House): Collect NP swab in VTM [[id number]] Collected: 06/14/21 0400    Order Status: Completed Specimen: Respirate Updated: 06/14/21 1528     SARS-CoV-2 Source NP Swab     SARS-CoV-2 by PCR NotDetected     Comment: PATIENTS: Important information regarding " "your results and instructions can  be found at [email or web address]   \"After your  Covid-19 Test\"    [facility] providers: PLEASE REFER TO DE-ESCALATION AND RETESTING PROTOCOL  on [email or web address]    **The TaqPath COVID-19 SARS-CoV-2 RT-PCR test has been made available for  use under the Emergency Use Authorization (EUA) only.         Narrative:      Have you been in close contact with a person who is suspected  or known to be positive for COVID-19 within the last 30 days  (e.g. last seen that person < 30 days ago)->No    URINALYSIS [[id number]]  (Abnormal) Collected: 06/14/21 0445    Order Status: Completed Specimen: Urine Updated: 06/14/21 0548     Color Yellow     Character Clear     Specific Gravity 1.013     Ph 7.5     Glucose Negative mg/dL      Ketones Negative mg/dL      Protein 30 mg/dL      Bilirubin Negative     Urobilinogen, Urine 0.2     Nitrite Positive     Leukocyte Esterase Trace     Occult Blood Moderate     Micro Urine Req Microscopic    Narrative:      Indication for culture:->Emergency Room Patient  Have you been in close contact with a person who is suspected  or known to be positive for COVID-19 within the last 30 days  (e.g. last seen that person < 30 days ago)->No          Assessment:  Active Hospital Problems    Diagnosis    • *Sepsis ([facility]) [A41.9]    • Bacteremia [R78.81]    • Acute renal failure (ARF) ([facility]) [N17.9]    • Pyelonephritis [N12]    • Lactic acidosis [E87.2]    • DNR (do not resuscitate) [Z66]        Plan:  MSSA sepsis  Afebrile  Leukocytosis decreased  FREDDY improved  BCxs + MSSA 6/14  Bcxs 6/16 neg at 48 hours  Continue to monitor for development of neck pain, back pain, joint pain.  Midline OK  TTE unrevealing; deferred MIMA due to rapid clearance blood cultures per COVID protocol  Anticipate 4 weeks IV abx from date of negative blood cxs  Continue IV cefazolin for now  Arrangements for home abx may be problematic given the patient is a resident of " [place] and not [place].  Recommendation for ertapenem 1 gram IV daily through 7/14/2021      Obstructive uropathy  Acute kidney injury-unknown baseline.  Pyelonephritis  Kidney culture +MSSA  s/p nephrostomy tube placement on 06/14.  Dose adjust abx as needed   Abx as above    Discussed with Hosp Dr Bills/CM

## 2021-06-18 NOTE — PROGRESS NOTES
[facility] Medicine Daily Progress Note    Date of Service  6/18/2021    Chief Complaint  81 y.o. male admitted 6/14/2021 with flank pain  Hospital Course    81 y.o. male with a past medical history of BPH status post vasectomy years ago presented to the [facility] emergency department on 6/13/2021 with 1 day history of worsening right-sided back pain, fever and chills.  Patient stating that he has had pain similar to such a month ago that resolved on its own.  He does have a history of untreated UTIs.  Patient denies any urinary frequency, dysuria or straining on urination.  At the [facility] emergency department, Vital signs with tachycardia at 120, 39 °C fever, tachypnea in the 20s.  Patient requiring 4 L nasal cannula to maintain saturation.  CBC without leukocytosis or anemia.  Chemistry with acute renal failure creatinine 1.8 and BUN 36.  Lactic acidosis at 3.8. Blood samples were collected for culture.  He received a 2 L bolus and was a start Rocephin regimen antibiotic regimen.       Patient was transferred to [facility] for sepsis secondary to pyonephritis. At the emergency department, vital signs with tachycardia in the 110s, tachypnea in the 20s, and hypotension with map at 63.  Patient was started on sepsis bolus and blood samples were collected for lab.  EDP as well as myself discussed case with Urology (Dr. Owens), who requested  tube placement by interventional radiology.  I discussed case with interventional radiology (Dr. Carvalho), who will evaluate case for nephrostomy tube placement indication.  Repeat CBC with leukocytosis at 17.1 with elevated polys and normocytic anemia with hemoglobin 12.6.  Patient was admitted to telemetry for sepsis secondary to pyelonephritis which required IV antibiotics.       Interval Problem Update    6/15/2021: The patient was seen and evaluated at bedside and appears comfortable.  Patient underwent placement of nephrostomy tube yesterday courtesy of interventional radiology.  At this  time patient's creatinine level is continuing to improve.  He does have some postoperative leukocytosis which we will continue to monitor.  Urology with current recommendations to continue nephrostomy tube as well as antibiotic therapy for at least 2 weeks prior to definitive intervention for kidney stone.  At this time we are pending final urine culture prior to discharge.  Patient denies any chest pains, palpitations, shortness of breath.  States that he is tolerating oral intake and is ambulating independently.  Pain is currently well controlled.  No other overnight events reported.    6/16/2021: Patient noted to have positive blood culture for MSSA.  Pending final cultures and sensitivities.  ID has been consulted and appreciate their recommendations.  Pending repeat blood cultures.  At this time patient remains comfortable.  Denies any chest pains, palpitations, shortness of breath.  No other overnight events reported.    6/17/2021: Patient continues to show interval improvement throughout the course of his stay.  Current recommendations from ID are to continue IV cefazolin for at least 4 weeks for MSSA bacteremia.  Case management currently assisting in finding placement.  Urology to evaluate patient for possible intervention on nephrolithiasis after 2 weeks of antibiotics.  Pending repeat blood cultures.  Transthoracic echo without any vegetations.  Patient is tolerating oral intake and ambulating dependently.  No other overnight events reported.    6/18/2021: Current ID recommendations are for ertapenem for prolonged course.  Case management currently assisting with outpatient antibiotic arrangements.  At this time patient remains comfortable.  He is tolerating oral intake and ambulating independently.  No other overnight events reported.    Consultants/Specialty  Nephrology  Urology    Code Status  DNAR/DNI    Disposition  Pending final urine culture report    Review of Systems  Review of Systems    Constitutional: Negative for chills and fever.   HENT: Negative for hearing loss, sore throat and tinnitus.    Eyes: Negative for blurred vision and double vision.   Respiratory: Negative for cough, hemoptysis and wheezing.    Cardiovascular: Negative for chest pain, palpitations, leg swelling and PND.   Gastrointestinal: Negative for blood in stool, heartburn, melena and nausea.   Genitourinary: Negative for dysuria and urgency.   Musculoskeletal: Negative for back pain, falls and myalgias.   Skin: Negative for itching and rash.   Neurological: Negative for dizziness, sensory change, seizures, loss of consciousness and headaches.   Endo/Heme/Allergies: Does not bruise/bleed easily.   Psychiatric/Behavioral: Negative for depression and suicidal ideas.        Physical Exam  Temp:  [36.6 °C (97.9 °F)-37.2 °C (99 °F)] 37.1 °C (98.7 °F)  Pulse:  [62-74] 74  Resp:  [17-18] 18  BP: (137-152)/(75-86) 152/75  SpO2:  [92 %-97 %] 95 %    Physical Exam  Vitals reviewed.   Constitutional:       Appearance: Normal appearance.   HENT:      Head: Normocephalic and atraumatic.      Nose: No congestion or rhinorrhea.      Mouth/Throat:      Mouth: Mucous membranes are moist.      Pharynx: Oropharynx is clear.   Eyes:      Extraocular Movements: Extraocular movements intact.      Conjunctiva/sclera: Conjunctivae normal.      Pupils: Pupils are equal, round, and reactive to light.   Cardiovascular:      Rate and Rhythm: Normal rate and regular rhythm.      Pulses: Normal pulses.      Heart sounds: Normal heart sounds.   Pulmonary:      Effort: Pulmonary effort is normal.      Breath sounds: Normal breath sounds.   Abdominal:      General: Abdomen is flat. Bowel sounds are normal.      Palpations: Abdomen is soft.   Musculoskeletal:         General: No swelling, tenderness or deformity.      Cervical back: Neck supple. No rigidity. No muscular tenderness.   Lymphadenopathy:      Cervical: No cervical adenopathy.   Skin:     General:  Skin is warm and dry.   Neurological:      General: No focal deficit present.      Mental Status: He is alert and oriented to person, place, and time. Mental status is at baseline.   Psychiatric:         Mood and Affect: Mood normal.         Behavior: Behavior normal.         Thought Content: Thought content normal.         Fluids    Intake/Output Summary (Last 24 hours) at 6/18/2021 1540  Last data filed at 6/18/2021 0900  Gross per 24 hour   Intake 240 ml   Output 1325 ml   Net -1085 ml       Laboratory  Recent Labs     06/16/21  0110 06/17/21  0045 06/18/21  0122   WBC 27.0* 16.9* 11.3*   RBC 3.47* 3.48* 3.54*   HEMOGLOBIN 11.4* 11.6* 11.6*   HEMATOCRIT 35.0* 34.7* 34.3*   .9* 99.7* 96.9   MCH 32.9 33.3* 32.8   MCHC 32.6* 33.4* 33.8   RDW 47.6 45.2 43.2   PLATELETCT 130* 152* 151*   MPV 11.8 11.6 10.6     Recent Labs     06/16/21  0110 06/17/21  0045 06/18/21  0122   SODIUM 136 136 138   POTASSIUM 4.3 3.9 3.6   CHLORIDE 107 104 104   CO2 22 24 24   GLUCOSE 98 113* 110*   BUN 31* 27* 21   CREATININE 1.43* 1.42* 1.07   CALCIUM 8.1* 8.5 8.2*                   Imaging  EC-ECHOCARDIOGRAM COMPLETE W/O CONT   Final Result      US-RENAL   Final Result      Unremarkable renal ultrasound.      IR-PERQ NEPH CATH NEW ACCESS (ALL RADIOLOGY) RIGHT   Final Result      1. ULTRASOUND AND FLUOROSCOPIC GUIDED PLACEMENT OF A RIGHT 8 Sami  PIGTAIL LOCKING LOOP PERCUTANEOUS NEPHROSTOMY CATHETER.      2. NEPHROSTOGRAM SHOWING SATISFACTORY CATHETER POSITION WITHOUT EXTRAVASATION. MODERATE RIGHT HYDRONEPHROSIS AND MILD RIGHT HYDROURETER.      DX-CHEST-PORTABLE (1 VIEW)   Final Result         1.  No focal infiltrates.   2.  Perihilar interstitial prominence and bronchial wall cuffing, appearance suggests changes of underlying bronchial inflammation, consider bronchitis.      OUTSIDE IMAGES-CT ABDOMEN /PELVIS   Final Result      OUTSIDE IMAGES-DX CHEST   Final Result      IR-MIDLINE CATHETER INSERTION WO GUIDANCE > AGE 3     (Results Pending)        Assessment/Plan  * Sepsis (HCC)- (present on admission)  Assessment & Plan  This is Severe Sepsis Present on admission  SIRS criteria identified on my evaluation include: Fever, with temperature greater than 101 deg F, Tachycardia, with heart rate greater than 90 BPM, Tachypnea, with respirations greater than 20 per minute and Leukocytosis, with WBC greater than 12,000  Source is right-sided pyelonephritis  Clinical indicators of end organ dysfunction include Systolic blood pressure (SBP) <90 mmHg or mean arterial pressure <65 mmHg  Sepsis protocol initiated  Fluid resuscitation ordered per protocol  IV antibiotics as appropriate for source of sepsis  Reassessment: I have reassessed the patient's hemodynamic status  End organ dysfunction include(s):  Acute respiratory failure and Acute kidney failure     SIRS 4/4 and qSOFA 2/3  Secondary to right-sided pyelonephritis from renal stone noted on outside facility CT  Continue IV hydration  Continue Rocephin antibiotic regimen  S/p R nephrostomy tube 6/14  Follow blood and urine cultures    Bacteremia  Assessment & Plan  MSSA Bacteremia  Pending ID recommendations for prolonged antibiotic course     DNR (do not resuscitate)- (present on admission)  Assessment & Plan  Had an extensive conversation with patient regarding CODE STATUS which patient reports that he would like to be DNR/DNI.  Patient voiced understanding and all questions were answered.    Lactic acidosis- (present on admission)  Assessment & Plan  3.8, 4.3  Map in the 60s  Aggressive IV hydration  Trend lactic acid    Pyelonephritis- (present on admission)  Assessment & Plan  Right-sided and noted at outside facility CT scan  Secondary to 3 mm stone  IV hydration  Continue IV antibiotics    Acute renal failure (ARF) (HCC)- (present on admission)  Assessment & Plan  Creatinine 1.8 at outside facility  Cr 2.4 here  Secondary to prerenal and postrenal etiologies (septic  shock/nephrolithiasis)  Right pyelonephritis noted on outside facility abdominal/pelvic CT  Continue IV hydration for now  Avoid nephrotoxins  Renal dose meds  Trend creatinine       VTE prophylaxis: SCDs and ambulation

## 2021-06-19 PROCEDURE — 99232 SBSQ HOSP IP/OBS MODERATE 35: CPT | Performed by: INTERNAL MEDICINE

## 2021-06-19 PROCEDURE — 770020 HCHG ROOM/CARE - TELE (206)

## 2021-06-19 PROCEDURE — 700111 HCHG RX REV CODE 636 W/ 250 OVERRIDE (IP): Performed by: INTERNAL MEDICINE

## 2021-06-19 PROCEDURE — 700105 HCHG RX REV CODE 258: Performed by: STUDENT IN AN ORGANIZED HEALTH CARE EDUCATION/TRAINING PROGRAM

## 2021-06-19 RX ADMIN — CEFAZOLIN SODIUM 2 G: 2 INJECTION, SOLUTION INTRAVENOUS at 05:17

## 2021-06-19 RX ADMIN — CEFAZOLIN SODIUM 2 G: 2 INJECTION, SOLUTION INTRAVENOUS at 21:50

## 2021-06-19 RX ADMIN — SODIUM CHLORIDE, POTASSIUM CHLORIDE, SODIUM LACTATE AND CALCIUM CHLORIDE: 600; 310; 30; 20 INJECTION, SOLUTION INTRAVENOUS at 15:28

## 2021-06-19 RX ADMIN — CEFAZOLIN SODIUM 2 G: 2 INJECTION, SOLUTION INTRAVENOUS at 15:28

## 2021-06-19 ASSESSMENT — ENCOUNTER SYMPTOMS
COUGH: 0
CONSTIPATION: 0
PALPITATIONS: 0
SENSORY CHANGE: 0
NAUSEA: 0
DEPRESSION: 0
DIZZINESS: 0
BACK PAIN: 0
HEADACHES: 0
BLOOD IN STOOL: 0
FEVER: 0
DOUBLE VISION: 0
HEMOPTYSIS: 0
SORE THROAT: 0
HEARTBURN: 0
MYALGIAS: 0
BLURRED VISION: 0
BRUISES/BLEEDS EASILY: 0
CHILLS: 0

## 2021-06-19 ASSESSMENT — PAIN DESCRIPTION - PAIN TYPE: TYPE: ACUTE PAIN

## 2021-06-19 ASSESSMENT — FIBROSIS 4 INDEX: FIB4 SCORE: 4.36

## 2021-06-19 NOTE — CARE PLAN
Problem: Knowledge Deficit - Standard  Goal: Patient and family/care givers will demonstrate understanding of plan of care, disease process/condition, diagnostic tests and medications  Outcome: Progressing  Note: Plan of care discussed-pt verbalizes undertsanding    Shift Goals  Clinical Goals: titrate oxygen to room air. pain control   Patient Goals: pass kidney stone         Patient is not progressing towards the following goals:

## 2021-06-19 NOTE — PROGRESS NOTES
Bedside report received from RENNY Ann. Call light and belongings within reach. Bed locked and in lowest position. Alarm and fall precautions in place.

## 2021-06-19 NOTE — PROGRESS NOTES
[facility] Medicine Daily Progress Note    Date of Service  6/19/2021    Chief Complaint  81 y.o. male admitted 6/14/2021 with flank pain  Hospital Course    81 y.o. male with a past medical history of BPH status post vasectomy years ago presented to the [facility] emergency department on 6/13/2021 with 1 day history of worsening right-sided back pain, fever and chills.  Patient stating that he has had pain similar to such a month ago that resolved on its own.  He does have a history of untreated UTIs.  Patient denies any urinary frequency, dysuria or straining on urination.  At the [facility] emergency department, Vital signs with tachycardia at 120, 39 °C fever, tachypnea in the 20s.  Patient requiring 4 L nasal cannula to maintain saturation.  CBC without leukocytosis or anemia.  Chemistry with acute renal failure creatinine 1.8 and BUN 36.  Lactic acidosis at 3.8. Blood samples were collected for culture.  He received a 2 L bolus and was a start Rocephin regimen antibiotic regimen.       Patient was transferred to [facility] for sepsis secondary to pyonephritis. At the emergency department, vital signs with tachycardia in the 110s, tachypnea in the 20s, and hypotension with map at 63.  Patient was started on sepsis bolus and blood samples were collected for lab.  EDP as well as myself discussed case with Urology (Dr. Owens), who requested  tube placement by interventional radiology.  I discussed case with interventional radiology (Dr. Carvalho), who will evaluate case for nephrostomy tube placement indication.  Repeat CBC with leukocytosis at 17.1 with elevated polys and normocytic anemia with hemoglobin 12.6.  Patient was admitted to telemetry for sepsis secondary to pyelonephritis which required IV antibiotics.       Interval Problem Update    6/15/2021: The patient was seen and evaluated at bedside and appears comfortable.  Patient underwent placement of nephrostomy tube yesterday courtesy of interventional radiology.  At this  time patient's creatinine level is continuing to improve.  He does have some postoperative leukocytosis which we will continue to monitor.  Urology with current recommendations to continue nephrostomy tube as well as antibiotic therapy for at least 2 weeks prior to definitive intervention for kidney stone.  At this time we are pending final urine culture prior to discharge.  Patient denies any chest pains, palpitations, shortness of breath.  States that he is tolerating oral intake and is ambulating independently.  Pain is currently well controlled.  No other overnight events reported.    6/16/2021: Patient noted to have positive blood culture for MSSA.  Pending final cultures and sensitivities.  ID has been consulted and appreciate their recommendations.  Pending repeat blood cultures.  At this time patient remains comfortable.  Denies any chest pains, palpitations, shortness of breath.  No other overnight events reported.    6/17/2021: Patient continues to show interval improvement throughout the course of his stay.  Current recommendations from ID are to continue IV cefazolin for at least 4 weeks for MSSA bacteremia.  Case management currently assisting in finding placement.  Urology to evaluate patient for possible intervention on nephrolithiasis after 2 weeks of antibiotics.  Pending repeat blood cultures.  Transthoracic echo without any vegetations.  Patient is tolerating oral intake and ambulating dependently.  No other overnight events reported.    6/18/2021: Current ID recommendations are for ertapenem for prolonged course.  Case management currently assisting with outpatient antibiotic arrangements.  At this time patient remains comfortable.  He is tolerating oral intake and ambulating independently.  No other overnight events reported.    6/19/2021: The patient was seen and evaluated at bedside and remains pleasant.  Patient is currently medically cleared for discharge once outpatient antibiotics have  been arranged in his hometown in [place].  He denies any chest pains, palpitations, shortness of breath.  He is ambulating dependently and tolerating oral intake.  He denies any chest pains, palpitations, or shortness of breath.  No other overnight events reported.    Consultants/Specialty  Nephrology  Urology    Code Status  DNAR/DNI    Disposition  Pending final urine culture report    Review of Systems  Review of Systems   Constitutional: Negative for chills and fever.   HENT: Negative for hearing loss, sore throat and tinnitus.    Eyes: Negative for blurred vision and double vision.   Respiratory: Negative for cough and hemoptysis.    Cardiovascular: Negative for chest pain and palpitations.   Gastrointestinal: Negative for blood in stool, constipation, heartburn, melena and nausea.   Genitourinary: Negative for dysuria and urgency.   Musculoskeletal: Negative for back pain and myalgias.   Skin: Negative for itching and rash.   Neurological: Negative for dizziness, sensory change and headaches.   Endo/Heme/Allergies: Does not bruise/bleed easily.   Psychiatric/Behavioral: Negative for depression and suicidal ideas.        Physical Exam  Temp:  [36.9 °C (98.4 °F)-37.5 °C (99.5 °F)] 36.9 °C (98.5 °F)  Pulse:  [60-76] 60  Resp:  [17-18] 18  BP: (141-150)/(75-88) 141/83  SpO2:  [90 %-95 %] 93 %    Physical Exam  Vitals reviewed.   Constitutional:       Appearance: Normal appearance.   HENT:      Head: Normocephalic and atraumatic.      Nose: No congestion or rhinorrhea.      Mouth/Throat:      Mouth: Mucous membranes are moist.      Pharynx: Oropharynx is clear.   Eyes:      Extraocular Movements: Extraocular movements intact.      Conjunctiva/sclera: Conjunctivae normal.      Pupils: Pupils are equal, round, and reactive to light.   Cardiovascular:      Rate and Rhythm: Normal rate and regular rhythm.      Pulses: Normal pulses.      Heart sounds: Normal heart sounds.   Pulmonary:      Effort: Pulmonary effort  is normal.      Breath sounds: Normal breath sounds.   Abdominal:      General: Abdomen is flat. Bowel sounds are normal.      Palpations: Abdomen is soft.   Musculoskeletal:         General: No swelling or tenderness.      Cervical back: Neck supple. No rigidity. No muscular tenderness.   Skin:     General: Skin is warm and dry.   Neurological:      General: No focal deficit present.      Mental Status: He is alert and oriented to person, place, and time. Mental status is at baseline.   Psychiatric:         Mood and Affect: Mood normal.         Behavior: Behavior normal.         Thought Content: Thought content normal.         Fluids    Intake/Output Summary (Last 24 hours) at 6/19/2021 1326  Last data filed at 6/19/2021 0547  Gross per 24 hour   Intake 600 ml   Output 3250 ml   Net -2650 ml       Laboratory  Recent Labs     06/17/21  0045 06/18/21  0122   WBC 16.9* 11.3*   RBC 3.48* 3.54*   HEMOGLOBIN 11.6* 11.6*   HEMATOCRIT 34.7* 34.3*   MCV 99.7* 96.9   MCH 33.3* 32.8   MCHC 33.4* 33.8   RDW 45.2 43.2   PLATELETCT 152* 151*   MPV 11.6 10.6     Recent Labs     06/17/21  0045 06/18/21  0122   SODIUM 136 138   POTASSIUM 3.9 3.6   CHLORIDE 104 104   CO2 24 24   GLUCOSE 113* 110*   BUN 27* 21   CREATININE 1.42* 1.07   CALCIUM 8.5 8.2*                   Imaging  EC-ECHOCARDIOGRAM COMPLETE W/O CONT   Final Result      US-RENAL   Final Result      Unremarkable renal ultrasound.      IR-PERQ NEPH CATH NEW ACCESS (ALL RADIOLOGY) RIGHT   Final Result      1. ULTRASOUND AND FLUOROSCOPIC GUIDED PLACEMENT OF A RIGHT 8 [place]ian  PIGTAIL LOCKING LOOP PERCUTANEOUS NEPHROSTOMY CATHETER.      2. NEPHROSTOGRAM SHOWING SATISFACTORY CATHETER POSITION WITHOUT EXTRAVASATION. MODERATE RIGHT HYDRONEPHROSIS AND MILD RIGHT HYDROURETER.      DX-CHEST-PORTABLE (1 VIEW)   Final Result         1.  No focal infiltrates.   2.  Perihilar interstitial prominence and bronchial wall cuffing, appearance suggests changes of underlying bronchial  inflammation, consider bronchitis.      OUTSIDE IMAGES-CT ABDOMEN /PELVIS   Final Result      OUTSIDE IMAGES-DX CHEST   Final Result      IR-MIDLINE CATHETER INSERTION WO GUIDANCE > AGE 3    (Results Pending)        Assessment/Plan  * Sepsis (HCC)- (present on admission)  Assessment & Plan  This is Severe Sepsis Present on admission  SIRS criteria identified on my evaluation include: Fever, with temperature greater than 101 deg F, Tachycardia, with heart rate greater than 90 BPM, Tachypnea, with respirations greater than 20 per minute and Leukocytosis, with WBC greater than 12,000  Source is right-sided pyelonephritis  Clinical indicators of end organ dysfunction include Systolic blood pressure (SBP) <90 mmHg or mean arterial pressure <65 mmHg  Sepsis protocol initiated  Fluid resuscitation ordered per protocol  IV antibiotics as appropriate for source of sepsis  Reassessment: I have reassessed the patient's hemodynamic status  End organ dysfunction include(s):  Acute respiratory failure and Acute kidney failure     SIRS 4/4 and qSOFA 2/3  Secondary to right-sided pyelonephritis from renal stone noted on outside facility CT  Continue IV hydration  Continue Rocephin antibiotic regimen  S/p R nephrostomy tube 6/14  Follow blood and urine cultures    Bacteremia  Assessment & Plan  MSSA Bacteremia  Pending ID recommendations for prolonged antibiotic course     DNR (do not resuscitate)- (present on admission)  Assessment & Plan  Had an extensive conversation with patient regarding CODE STATUS which patient reports that he would like to be DNR/DNI.  Patient voiced understanding and all questions were answered.    Lactic acidosis- (present on admission)  Assessment & Plan  3.8, 4.3  Map in the 60s  Aggressive IV hydration  Trend lactic acid    Pyelonephritis- (present on admission)  Assessment & Plan  Right-sided and noted at outside facility CT scan  Secondary to 3 mm stone  IV hydration  Continue IV antibiotics    Acute  renal failure (ARF) (HCC)- (present on admission)  Assessment & Plan  Creatinine 1.8 at outside facility  Cr 2.4 here  Secondary to prerenal and postrenal etiologies (septic shock/nephrolithiasis)  Right pyelonephritis noted on outside facility abdominal/pelvic CT  Continue IV hydration for now  Avoid nephrotoxins  Renal dose meds  Trend creatinine       VTE prophylaxis: SCDs and ambulation

## 2021-06-19 NOTE — CARE PLAN
Problem: Knowledge Deficit - Standard  Goal: Patient and family/care givers will demonstrate understanding of plan of care, disease process/condition, diagnostic tests and medications  Outcome: Progressing     Problem: Fluid Volume  Goal: Fluid volume balance will be maintained  Outcome: Progressing   The patient is Stable - Low risk of patient condition declining or worsening    Shift Goals  Clinical Goals: titrate oxygen to room air. pain control   Patient Goals: pass kidney stone     Progress made toward(s) clinical / shift goals:  Pt on RA at 96% while sleeping. Urine being strained for stones. Pt having good oral intake as well as output. No complaints of pain.     Patient is not progressing towards the following goals:NA

## 2021-06-19 NOTE — PROGRESS NOTES
Bedside report received from RENNY Aldrich. POC discussed with pt; all questions answered at this time. No complaint of pain at this time. Call light within reach. Fall precautions in place.

## 2021-06-19 NOTE — PROGRESS NOTES
Patient noted swelling on posterior penis. No pain. No difficulty urinating. MD updated. Educated patient to inform RN of worsening symptoms.

## 2021-06-20 PROCEDURE — 99232 SBSQ HOSP IP/OBS MODERATE 35: CPT | Performed by: INTERNAL MEDICINE

## 2021-06-20 PROCEDURE — 700111 HCHG RX REV CODE 636 W/ 250 OVERRIDE (IP): Performed by: INTERNAL MEDICINE

## 2021-06-20 PROCEDURE — 700105 HCHG RX REV CODE 258: Performed by: STUDENT IN AN ORGANIZED HEALTH CARE EDUCATION/TRAINING PROGRAM

## 2021-06-20 PROCEDURE — 770020 HCHG ROOM/CARE - TELE (206)

## 2021-06-20 RX ORDER — DIPHENHYDRAMINE HCL 25 MG
25 TABLET ORAL EVERY 8 HOURS PRN
Status: DISCONTINUED | OUTPATIENT
Start: 2021-06-20 | End: 2021-06-22 | Stop reason: HOSPADM

## 2021-06-20 RX ADMIN — CEFAZOLIN SODIUM 2 G: 2 INJECTION, SOLUTION INTRAVENOUS at 21:14

## 2021-06-20 RX ADMIN — CEFAZOLIN SODIUM 2 G: 2 INJECTION, SOLUTION INTRAVENOUS at 06:15

## 2021-06-20 RX ADMIN — SODIUM CHLORIDE, POTASSIUM CHLORIDE, SODIUM LACTATE AND CALCIUM CHLORIDE: 600; 310; 30; 20 INJECTION, SOLUTION INTRAVENOUS at 13:50

## 2021-06-20 RX ADMIN — CEFAZOLIN SODIUM 2 G: 2 INJECTION, SOLUTION INTRAVENOUS at 13:50

## 2021-06-20 ASSESSMENT — FIBROSIS 4 INDEX: FIB4 SCORE: 4.36

## 2021-06-20 ASSESSMENT — ENCOUNTER SYMPTOMS
COUGH: 0
BLURRED VISION: 0
DOUBLE VISION: 0
FEVER: 0
HEMOPTYSIS: 0
CHILLS: 0
BRUISES/BLEEDS EASILY: 0
HEARTBURN: 0
DIZZINESS: 0
HEADACHES: 0
NAUSEA: 0
BACK PAIN: 0
DEPRESSION: 0
PALPITATIONS: 0
MYALGIAS: 0

## 2021-06-20 NOTE — DISCHARGE PLANNING
Anticipated Discharge Disposition: home with outpatient infusions    Action: RN CM received voicemail from patient's spouse saying that [facility] who said [facility] need [facility] orders send to patient's PCP. RENNY MONTALVO was told by Dimas at the [facility] that they had already received antibiotic information and a message was sent for them to reach out to this RN CM for further assistance if needed. RN CM called [facility] and to [facility] to get fax number for Dr. Cho to send patient information regarding antibiotic needs in case they did not have it.  said to fax to [phone] and he will email information to Dr. Cho.    Barriers to Discharge: outpatient infusions arranged    Plan: Case coordination to continue to provide discharge planning support

## 2021-06-20 NOTE — PROGRESS NOTES
Report received by day shift RN. Pt awake alert and oriented x 4 with no c/o pain. Discussed POC and verbalized understanding. Bed locked in low position with fall precautions in place and call light in reach.

## 2021-06-20 NOTE — PROGRESS NOTES
Monitor Summary:   Rhythm: SR  Rate: 61-76  Measurement: .14/.06/.38  Ectopy: RPVC, RBig, RCoup, OPAC

## 2021-06-20 NOTE — PROGRESS NOTES
Assumed care at 0700. Bedside report received from RENNY Nunes. Patient's chart and MAR reviewed. Pt denies pain at this time. Pt is A & O 4. Patient was updated on plan of care for the day. Questions answered and concerns addressed.  Pt denies any additional needs at this time. White board updated. Call light, phone and personal belongings within reach.

## 2021-06-20 NOTE — PROGRESS NOTES
[facility] Medicine Daily Progress Note    Date of Service  6/20/2021    Chief Complaint  81 y.o. male admitted 6/14/2021 with flank pain  Hospital Course    81 y.o. male with a past medical history of BPH status post vasectomy years ago presented to the [facility] emergency department on 6/13/2021 with 1 day history of worsening right-sided back pain, fever and chills.  Patient stating that he has had pain similar to such a month ago that resolved on its own.  He does have a history of untreated UTIs.  Patient denies any urinary frequency, dysuria or straining on urination.  At the [facility] emergency department, Vital signs with tachycardia at 120, 39 °C fever, tachypnea in the 20s.  Patient requiring 4 L nasal cannula to maintain saturation.  CBC without leukocytosis or anemia.  Chemistry with acute renal failure creatinine 1.8 and BUN 36.  Lactic acidosis at 3.8. Blood samples were collected for culture.  He received a 2 L bolus and was a start Rocephin regimen antibiotic regimen.       Patient was transferred to [facility] for sepsis secondary to pyonephritis. At the emergency department, vital signs with tachycardia in the 110s, tachypnea in the 20s, and hypotension with map at 63.  Patient was started on sepsis bolus and blood samples were collected for lab.  EDP as well as myself discussed case with Urology (Dr. Owens), who requested  tube placement by interventional radiology.  I discussed case with interventional radiology (Dr. Carvalho), who will evaluate case for nephrostomy tube placement indication.  Repeat CBC with leukocytosis at 17.1 with elevated polys and normocytic anemia with hemoglobin 12.6.  Patient was admitted to telemetry for sepsis secondary to pyelonephritis which required IV antibiotics.       Interval Problem Update    6/15/2021: The patient was seen and evaluated at bedside and appears comfortable.  Patient underwent placement of nephrostomy tube yesterday courtesy of interventional radiology.  At this  time patient's creatinine level is continuing to improve.  He does have some postoperative leukocytosis which we will continue to monitor.  Urology with current recommendations to continue nephrostomy tube as well as antibiotic therapy for at least 2 weeks prior to definitive intervention for kidney stone.  At this time we are pending final urine culture prior to discharge.  Patient denies any chest pains, palpitations, shortness of breath.  States that he is tolerating oral intake and is ambulating independently.  Pain is currently well controlled.  No other overnight events reported.    6/16/2021: Patient noted to have positive blood culture for MSSA.  Pending final cultures and sensitivities.  ID has been consulted and appreciate their recommendations.  Pending repeat blood cultures.  At this time patient remains comfortable.  Denies any chest pains, palpitations, shortness of breath.  No other overnight events reported.    6/17/2021: Patient continues to show interval improvement throughout the course of his stay.  Current recommendations from ID are to continue IV cefazolin for at least 4 weeks for MSSA bacteremia.  Case management currently assisting in finding placement.  Urology to evaluate patient for possible intervention on nephrolithiasis after 2 weeks of antibiotics.  Pending repeat blood cultures.  Transthoracic echo without any vegetations.  Patient is tolerating oral intake and ambulating dependently.  No other overnight events reported.    6/18/2021: Current ID recommendations are for ertapenem for prolonged course.  Case management currently assisting with outpatient antibiotic arrangements.  At this time patient remains comfortable.  He is tolerating oral intake and ambulating independently.  No other overnight events reported.    6/19/2021: The patient was seen and evaluated at bedside and remains pleasant.  Patient is currently medically cleared for discharge once outpatient antibiotics have  been arranged in his hometown in [place].  He denies any chest pains, palpitations, shortness of breath.  He is ambulating dependently and tolerating oral intake.  He denies any chest pains, palpitations, or shortness of breath.  No other overnight events reported.    6/20/2021: Patient without acute changes.  Denies any nausea/vomiting or bowel/bladder disturbances.  No acute events noted.  Patient is medically cleared for discharge once outpatient antibiotics have been arranged.    Consultants/Specialty  Nephrology  Urology    Code Status  DNAR/DNI    Disposition  Pending outpatient antibiotic arrangements    Review of Systems  Review of Systems   Constitutional: Negative for chills and fever.   HENT: Negative for hearing loss and tinnitus.    Eyes: Negative for blurred vision and double vision.   Respiratory: Negative for cough and hemoptysis.    Cardiovascular: Negative for chest pain and palpitations.   Gastrointestinal: Negative for heartburn and nausea.   Genitourinary: Negative for dysuria and urgency.   Musculoskeletal: Negative for back pain and myalgias.   Skin: Negative for itching and rash.   Neurological: Negative for dizziness and headaches.   Endo/Heme/Allergies: Does not bruise/bleed easily.   Psychiatric/Behavioral: Negative for depression and suicidal ideas.        Physical Exam  Temp:  [36.6 °C (97.9 °F)-37.1 °C (98.7 °F)] 37 °C (98.6 °F)  Pulse:  [65-75] 73  Resp:  [15-18] 16  BP: (141-162)/(78-90) 141/86  SpO2:  [91 %-95 %] 95 %    Physical Exam  Vitals reviewed.   Constitutional:       Appearance: Normal appearance.   HENT:      Head: Normocephalic and atraumatic.      Mouth/Throat:      Mouth: Mucous membranes are moist.      Pharynx: Oropharynx is clear.   Eyes:      Extraocular Movements: Extraocular movements intact.      Conjunctiva/sclera: Conjunctivae normal.      Pupils: Pupils are equal, round, and reactive to light.   Cardiovascular:      Rate and Rhythm: Normal rate and regular  rhythm.      Pulses: Normal pulses.      Heart sounds: Normal heart sounds.   Pulmonary:      Effort: Pulmonary effort is normal.      Breath sounds: Normal breath sounds.   Abdominal:      General: Abdomen is flat. Bowel sounds are normal.      Palpations: Abdomen is soft.   Musculoskeletal:         General: No deformity or signs of injury.      Cervical back: Neck supple. No rigidity. No muscular tenderness.      Right lower leg: No edema.      Left lower leg: No edema.   Skin:     General: Skin is warm and dry.   Neurological:      General: No focal deficit present.      Mental Status: He is alert and oriented to person, place, and time. Mental status is at baseline.   Psychiatric:         Mood and Affect: Mood normal.         Behavior: Behavior normal.         Thought Content: Thought content normal.         Fluids    Intake/Output Summary (Last 24 hours) at 6/20/2021 1506  Last data filed at 6/20/2021 0800  Gross per 24 hour   Intake 600 ml   Output 1540 ml   Net -940 ml       Laboratory  Recent Labs     06/18/21  0122   WBC 11.3*   RBC 3.54*   HEMOGLOBIN 11.6*   HEMATOCRIT 34.3*   MCV 96.9   MCH 32.8   MCHC 33.8   RDW 43.2   PLATELETCT 151*   MPV 10.6     Recent Labs     06/18/21  0122   SODIUM 138   POTASSIUM 3.6   CHLORIDE 104   CO2 24   GLUCOSE 110*   BUN 21   CREATININE 1.07   CALCIUM 8.2*                   Imaging  EC-ECHOCARDIOGRAM COMPLETE W/O CONT   Final Result      US-RENAL   Final Result      Unremarkable renal ultrasound.      IR-PERQ NEPH CATH NEW ACCESS (ALL RADIOLOGY) RIGHT   Final Result      1. ULTRASOUND AND FLUOROSCOPIC GUIDED PLACEMENT OF A RIGHT 8 Tamazight  PIGTAIL LOCKING LOOP PERCUTANEOUS NEPHROSTOMY CATHETER.      2. NEPHROSTOGRAM SHOWING SATISFACTORY CATHETER POSITION WITHOUT EXTRAVASATION. MODERATE RIGHT HYDRONEPHROSIS AND MILD RIGHT HYDROURETER.      DX-CHEST-PORTABLE (1 VIEW)   Final Result         1.  No focal infiltrates.   2.  Perihilar interstitial prominence and bronchial wall  cuffing, appearance suggests changes of underlying bronchial inflammation, consider bronchitis.      OUTSIDE IMAGES-CT ABDOMEN /PELVIS   Final Result      OUTSIDE IMAGES-DX CHEST   Final Result      IR-MIDLINE CATHETER INSERTION WO GUIDANCE > AGE 3    (Results Pending)        Assessment/Plan  * Sepsis (HCC)- (present on admission)  Assessment & Plan  This is Severe Sepsis Present on admission  SIRS criteria identified on my evaluation include: Fever, with temperature greater than 101 deg F, Tachycardia, with heart rate greater than 90 BPM, Tachypnea, with respirations greater than 20 per minute and Leukocytosis, with WBC greater than 12,000  Source is right-sided pyelonephritis  Clinical indicators of end organ dysfunction include Systolic blood pressure (SBP) <90 mmHg or mean arterial pressure <65 mmHg  Sepsis protocol initiated  Fluid resuscitation ordered per protocol  IV antibiotics as appropriate for source of sepsis  Reassessment: I have reassessed the patient's hemodynamic status  End organ dysfunction include(s):  Acute respiratory failure and Acute kidney failure     SIRS 4/4 and qSOFA 2/3  Secondary to right-sided pyelonephritis from renal stone noted on outside facility CT  Continue IV hydration  Continue Rocephin antibiotic regimen  S/p R nephrostomy tube 6/14  Follow blood and urine cultures    Bacteremia  Assessment & Plan  MSSA Bacteremia  Pending ID recommendations for prolonged antibiotic course     DNR (do not resuscitate)- (present on admission)  Assessment & Plan  Had an extensive conversation with patient regarding CODE STATUS which patient reports that he would like to be DNR/DNI.  Patient voiced understanding and all questions were answered.    Lactic acidosis- (present on admission)  Assessment & Plan  3.8, 4.3  Map in the 60s  Aggressive IV hydration  Trend lactic acid    Pyelonephritis- (present on admission)  Assessment & Plan  Right-sided and noted at outside facility CT scan  Secondary  to 3 mm stone  IV hydration  Continue IV antibiotics    Acute renal failure (ARF) (HCC)- (present on admission)  Assessment & Plan  Creatinine 1.8 at outside facility  Cr 2.4 here  Secondary to prerenal and postrenal etiologies (septic shock/nephrolithiasis)  Right pyelonephritis noted on outside facility abdominal/pelvic CT  Continue IV hydration for now  Avoid nephrotoxins  Renal dose meds  Trend creatinine       VTE prophylaxis: SCDs and ambulation

## 2021-06-20 NOTE — CARE PLAN
Problem: Knowledge Deficit - Standard  Goal: Patient and family/care givers will demonstrate understanding of plan of care, disease process/condition, diagnostic tests and medications  Outcome: Progressing     Problem: Fluid Volume  Goal: Fluid volume balance will be maintained  Outcome: Progressing   The patient is Stable - Low risk of patient condition declining or worsening    Shift Goals  Clinical Goals: titrate oxygen to room air. pain control   Patient Goals: pass kidney stone     Progress made toward(s) clinical / shift goals:  Keep pain controlled at 2/10 or lower.    Patient is not progressing towards the following goals:

## 2021-06-21 LAB
BACTERIA BLD CULT: NORMAL
SIGNIFICANT IND 70042: NORMAL
SITE SITE: NORMAL
SOURCE SOURCE: NORMAL

## 2021-06-21 PROCEDURE — 700111 HCHG RX REV CODE 636 W/ 250 OVERRIDE (IP): Performed by: INTERNAL MEDICINE

## 2021-06-21 PROCEDURE — 99232 SBSQ HOSP IP/OBS MODERATE 35: CPT | Performed by: INTERNAL MEDICINE

## 2021-06-21 PROCEDURE — 770020 HCHG ROOM/CARE - TELE (206)

## 2021-06-21 RX ADMIN — CEFAZOLIN SODIUM 2 G: 2 INJECTION, SOLUTION INTRAVENOUS at 04:51

## 2021-06-21 RX ADMIN — CEFAZOLIN SODIUM 2 G: 2 INJECTION, SOLUTION INTRAVENOUS at 22:19

## 2021-06-21 RX ADMIN — CEFAZOLIN SODIUM 2 G: 2 INJECTION, SOLUTION INTRAVENOUS at 14:37

## 2021-06-21 ASSESSMENT — ENCOUNTER SYMPTOMS
SORE THROAT: 0
LOSS OF CONSCIOUSNESS: 0
INSOMNIA: 0
COUGH: 0
FALLS: 0
MEMORY LOSS: 0
CHILLS: 0
MYALGIAS: 0
BLURRED VISION: 0
PND: 0
DEPRESSION: 0
WHEEZING: 0
BACK PAIN: 0
HEADACHES: 0
BRUISES/BLEEDS EASILY: 0
SEIZURES: 0
HEMOPTYSIS: 0
FLANK PAIN: 0
FEVER: 0
HEARTBURN: 0
STRIDOR: 0
PALPITATIONS: 0
DOUBLE VISION: 0
NAUSEA: 0
DIZZINESS: 0

## 2021-06-21 ASSESSMENT — FIBROSIS 4 INDEX: FIB4 SCORE: 4.36

## 2021-06-21 ASSESSMENT — PAIN DESCRIPTION - PAIN TYPE: TYPE: ACUTE PAIN

## 2021-06-21 NOTE — DISCHARGE PLANNING
Anticipated Discharge Disposition: home with home infusions    Action: [facility] to check on progress of setting [facility] for patient. Kristel with [facility] called this RN CM back and they need infusion order and patient information faxed to [phone]. She also needs to know if patient and spouse prefer home infusions vs outpatient. RENNY MONTALVO spoke with Sharon, patient's spouse, and she and patient prefer home infusions. RN CM faxed requested information to Kristel along with message informing her that they want home infusions. Per Kristel, patient likely will not be able to discharge today. Dr. Martin, ID physician with [facility], will be in charge of writing orders for patient's infusions.    Barriers to Discharge: home infusions set up    Plan: Case coordination to f/u with pt, family and treatment team for discharge planning support    Care Transition Team Assessment           ELISEK is patient's spouse, Sharon, [phone]    Information Source  Orientation Level: Oriented X4  Information Given By: Patient  Who is responsible for making decisions for patient? : Patient    Readmission Evaluation  Is this a readmission?: No    Elopement Risk  Legal Hold: No  Ambulatory or Self Mobile in Wheelchair: Yes  Disoriented: No  Psychiatric Symptoms: None  History of Wandering: No  Elopement this Admit: No  Vocalizing Wanting to Leave: No  Displays Behaviors, Body Language Wanting to Leave: No-Not at Risk for Elopement  Elopement Risk: Not at Risk for Elopement    Interdisciplinary Discharge Planning  Primary Care Physician: Mateo Cho  Lives with - Patient's Self Care Capacity: Spouse (home as needed, can help as needed)  Patient or legal guardian wants to designate a caregiver: Yes  Caregiver name: Sharon Odell  Caregiver contact info: [phone]  ([facility] [place]) Authorization for Release of Health Information has been completed: Yes  Support Systems: Family Member(s), Spouse / Significant Other  Housing / Facility: [address]  Prior Services: None  Durable Medical Equipment: Not Applicable    Discharge Preparedness  What is your plan after discharge?: Home with help  What are your discharge supports?: Spouse  Prior Functional Level: Ambulatory, Independent with Activities of Daily Living    Functional Assesment  Prior Functional Level: Ambulatory, Independent with Activities of Daily Living    Finances  Financial Barriers to Discharge: No  Prescription Coverage: Yes    Vision / Hearing Impairment  Vision Impairment : No  Right Eye Vision: Impaired, Wears Glasses  Left Eye Vision: Impaired, Wears Glasses  Hearing Impairment : No  Hearing Impairment: Both Ears, Hearing Device(s) Available    Domestic Abuse  Have you ever been the victim of abuse or violence?: No  Physical Abuse or Sexual Abuse: No  Verbal Abuse or Emotional Abuse: No  Possible Abuse/Neglect Reported to:: Not Applicable    Anticipated Discharge Information  Discharge Disposition: Discharged to home/self care (01)  Discharge Address: [address] [place] [id number]  Discharge Contact Phone Number: [phone]

## 2021-06-21 NOTE — PROGRESS NOTES
Assumed care at 0715, bedside report received from NOC RN. Pt is SR on the telemetry monitor. Patient is AO x 4 and is resting in bed. Initial assessment completed and orders reviewed. POC discussed with patient. Call light within reach and hourly rounding in place. No further questions at this time. Fall precautions in place.

## 2021-06-21 NOTE — PROGRESS NOTES
Bedside shift report received from RENNY Carrera.  Safety check complete.  All patient needs met at this time.  Will continue to monitor.

## 2021-06-21 NOTE — PROGRESS NOTES
12-Hour CC    Monitor Summary  NSR/SB w/ frequent PVC, frequent PAC, rare couplet  54-91 bpm  .13/.10/.43

## 2021-06-21 NOTE — CARE PLAN
The patient is: Stable - Low risk of patient condition declining or worsening    Progress made toward(s) clinical / shift goals:  Patient is hemodynamically stable and free from falls.  IV ABX ongoing.  Patient reports no pain.      Patient is not progressing towards the following goals:  Prolonged/complicated discharge planning.          Problem: Knowledge Deficit - Standard  Goal: Patient and family/care givers will demonstrate understanding of plan of care, disease process/condition, diagnostic tests and medications  Outcome: Progressing     Problem: Hemodynamics  Goal: Patient's hemodynamics, fluid balance and neurologic status will be stable or improve  Outcome: Progressing     Problem: Fluid Volume  Goal: Fluid volume balance will be maintained  Outcome: Progressing     Problem: Urinary - Renal Perfusion  Goal: Ability to achieve and maintain adequate renal perfusion and functioning will improve  Outcome: Progressing     Problem: Respiratory  Goal: Patient will achieve/maintain optimum respiratory ventilation and gas exchange  Outcome: Progressing     Problem: Physical Regulation  Goal: Diagnostic test results will improve  Outcome: Progressing  Goal: Signs and symptoms of infection will decrease  Outcome: Progressing

## 2021-06-21 NOTE — PROGRESS NOTES
[facility] Medicine Daily Progress Note    Date of Service  6/21/2021    Chief Complaint  81 y.o. male admitted 6/14/2021 with flank pain  Hospital Course    81 y.o. male with a past medical history of BPH status post vasectomy years ago presented to the [facility] emergency department on 6/13/2021 with 1 day history of worsening right-sided back pain, fever and chills.  Patient stating that he has had pain similar to such a month ago that resolved on its own.  He does have a history of untreated UTIs.  Patient denies any urinary frequency, dysuria or straining on urination.  At the [facility] emergency department, Vital signs with tachycardia at 120, 39 °C fever, tachypnea in the 20s.  Patient requiring 4 L nasal cannula to maintain saturation.  CBC without leukocytosis or anemia.  Chemistry with acute renal failure creatinine 1.8 and BUN 36.  Lactic acidosis at 3.8. Blood samples were collected for culture.  He received a 2 L bolus and was a start Rocephin regimen antibiotic regimen.       Patient was transferred to [facility] for sepsis secondary to pyonephritis. At the emergency department, vital signs with tachycardia in the 110s, tachypnea in the 20s, and hypotension with map at 63.  Patient was started on sepsis bolus and blood samples were collected for lab.  EDP as well as myself discussed case with Urology (Dr. Owens), who requested  tube placement by interventional radiology.  I discussed case with interventional radiology (Dr. Carvalho), who will evaluate case for nephrostomy tube placement indication.  Repeat CBC with leukocytosis at 17.1 with elevated polys and normocytic anemia with hemoglobin 12.6.  Patient was admitted to telemetry for sepsis secondary to pyelonephritis which required IV antibiotics.       Interval Problem Update    6/15/2021: The patient was seen and evaluated at bedside and appears comfortable.  Patient underwent placement of nephrostomy tube yesterday courtesy of interventional radiology.  At this  time patient's creatinine level is continuing to improve.  He does have some postoperative leukocytosis which we will continue to monitor.  Urology with current recommendations to continue nephrostomy tube as well as antibiotic therapy for at least 2 weeks prior to definitive intervention for kidney stone.  At this time we are pending final urine culture prior to discharge.  Patient denies any chest pains, palpitations, shortness of breath.  States that he is tolerating oral intake and is ambulating independently.  Pain is currently well controlled.  No other overnight events reported.    6/16/2021: Patient noted to have positive blood culture for MSSA.  Pending final cultures and sensitivities.  ID has been consulted and appreciate their recommendations.  Pending repeat blood cultures.  At this time patient remains comfortable.  Denies any chest pains, palpitations, shortness of breath.  No other overnight events reported.    6/17/2021: Patient continues to show interval improvement throughout the course of his stay.  Current recommendations from ID are to continue IV cefazolin for at least 4 weeks for MSSA bacteremia.  Case management currently assisting in finding placement.  Urology to evaluate patient for possible intervention on nephrolithiasis after 2 weeks of antibiotics.  Pending repeat blood cultures.  Transthoracic echo without any vegetations.  Patient is tolerating oral intake and ambulating dependently.  No other overnight events reported.    6/18/2021: Current ID recommendations are for ertapenem for prolonged course.  Case management currently assisting with outpatient antibiotic arrangements.  At this time patient remains comfortable.  He is tolerating oral intake and ambulating independently.  No other overnight events reported.    6/19/2021: The patient was seen and evaluated at bedside and remains pleasant.  Patient is currently medically cleared for discharge once outpatient antibiotics have  been arranged in his hometown in [place].  He denies any chest pains, palpitations, shortness of breath.  He is ambulating dependently and tolerating oral intake.  He denies any chest pains, palpitations, or shortness of breath.  No other overnight events reported.    6/20/2021: Patient without acute changes.  Denies any nausea/vomiting or bowel/bladder disturbances.  No acute events noted.  Patient is medically cleared for discharge once outpatient antibiotics have been arranged.    6/21/2021: The patient continues to do well.  He remains medically cleared for discharge.  [facility]  No other overnight events reported.    Consultants/Specialty  Nephrology  Urology    Code Status  DNAR/DNI    Disposition  Pending outpatient antibiotic arrangements    Review of Systems  Review of Systems   Constitutional: Negative for chills and fever.   HENT: Negative for hearing loss, sore throat and tinnitus.    Eyes: Negative for blurred vision and double vision.   Respiratory: Negative for cough, hemoptysis, wheezing and stridor.    Cardiovascular: Negative for chest pain, palpitations, leg swelling and PND.   Gastrointestinal: Negative for heartburn and nausea.   Genitourinary: Negative for dysuria, flank pain and urgency.   Musculoskeletal: Negative for back pain, falls and myalgias.   Skin: Negative for itching and rash.   Neurological: Negative for dizziness, seizures, loss of consciousness and headaches.   Endo/Heme/Allergies: Does not bruise/bleed easily.   Psychiatric/Behavioral: Negative for depression, memory loss and suicidal ideas. The patient does not have insomnia.         Physical Exam  Temp:  [36.8 °C (98.2 °F)-37.3 °C (99.1 °F)] 37.2 °C (98.9 °F)  Pulse:  [58-77] 65  Resp:  [16-20] 16  BP: (137-156)/(77-85) 141/81  SpO2:  [91 %-96 %] 96 %    Physical Exam  Vitals reviewed.   Constitutional:       Appearance: Normal appearance.   HENT:       Head: Normocephalic and atraumatic.      Mouth/Throat:      Mouth: Mucous membranes are moist.      Pharynx: Oropharynx is clear.   Eyes:      Extraocular Movements: Extraocular movements intact.      Conjunctiva/sclera: Conjunctivae normal.      Pupils: Pupils are equal, round, and reactive to light.   Cardiovascular:      Rate and Rhythm: Normal rate and regular rhythm.      Pulses: Normal pulses.      Heart sounds: Normal heart sounds.   Pulmonary:      Effort: Pulmonary effort is normal.      Breath sounds: Normal breath sounds.   Abdominal:      General: Abdomen is flat. Bowel sounds are normal.      Palpations: Abdomen is soft.   Musculoskeletal:         General: No swelling or tenderness.      Cervical back: Neck supple. No rigidity. No muscular tenderness.   Skin:     General: Skin is warm and dry.   Neurological:      General: No focal deficit present.      Mental Status: He is alert and oriented to person, place, and time. Mental status is at baseline.   Psychiatric:         Mood and Affect: Mood normal.         Behavior: Behavior normal.         Thought Content: Thought content normal.         Fluids    Intake/Output Summary (Last 24 hours) at 6/21/2021 1553  Last data filed at 6/21/2021 0900  Gross per 24 hour   Intake 200 ml   Output 2800 ml   Net -2600 ml       Laboratory                        Imaging  EC-ECHOCARDIOGRAM COMPLETE W/O CONT   Final Result      US-RENAL   Final Result      Unremarkable renal ultrasound.      IR-PERQ NEPH CATH NEW ACCESS (ALL RADIOLOGY) RIGHT   Final Result      1. ULTRASOUND AND FLUOROSCOPIC GUIDED PLACEMENT OF A RIGHT 8 [place]  PIGTAIL LOCKING LOOP PERCUTANEOUS NEPHROSTOMY CATHETER.      2. NEPHROSTOGRAM SHOWING SATISFACTORY CATHETER POSITION WITHOUT EXTRAVASATION. MODERATE RIGHT HYDRONEPHROSIS AND MILD RIGHT HYDROURETER.      DX-CHEST-PORTABLE (1 VIEW)   Final Result         1.  No focal infiltrates.   2.  Perihilar interstitial prominence and bronchial wall cuffing,  appearance suggests changes of underlying bronchial inflammation, consider bronchitis.      OUTSIDE IMAGES-CT ABDOMEN /PELVIS   Final Result      OUTSIDE IMAGES-DX CHEST   Final Result      IR-MIDLINE CATHETER INSERTION WO GUIDANCE > AGE 3    (Results Pending)        Assessment/Plan  * Sepsis (HCC)- (present on admission)  Assessment & Plan  This is Severe Sepsis Present on admission  SIRS criteria identified on my evaluation include: Fever, with temperature greater than 101 deg F, Tachycardia, with heart rate greater than 90 BPM, Tachypnea, with respirations greater than 20 per minute and Leukocytosis, with WBC greater than 12,000  Source is right-sided pyelonephritis  Clinical indicators of end organ dysfunction include Systolic blood pressure (SBP) <90 mmHg or mean arterial pressure <65 mmHg  Sepsis protocol initiated  Fluid resuscitation ordered per protocol  IV antibiotics as appropriate for source of sepsis  Reassessment: I have reassessed the patient's hemodynamic status  End organ dysfunction include(s):  Acute respiratory failure and Acute kidney failure     SIRS 4/4 and qSOFA 2/3  Secondary to right-sided pyelonephritis from renal stone noted on outside facility CT  Continue IV hydration  Continue Rocephin antibiotic regimen  S/p R nephrostomy tube 6/14  Follow blood and urine cultures    Bacteremia  Assessment & Plan  MSSA Bacteremia  Pending ID recommendations for prolonged antibiotic course     DNR (do not resuscitate)- (present on admission)  Assessment & Plan  Had an extensive conversation with patient regarding CODE STATUS which patient reports that he would like to be DNR/DNI.  Patient voiced understanding and all questions were answered.    Lactic acidosis- (present on admission)  Assessment & Plan  3.8, 4.3  Map in the 60s  Aggressive IV hydration  Trend lactic acid    Pyelonephritis- (present on admission)  Assessment & Plan  Right-sided and noted at outside facility CT scan  Secondary to 3 mm  stone  IV hydration  Continue IV antibiotics    Acute renal failure (ARF) (HCC)- (present on admission)  Assessment & Plan  Creatinine 1.8 at outside facility  Cr 2.4 here  Secondary to prerenal and postrenal etiologies (septic shock/nephrolithiasis)  Right pyelonephritis noted on outside facility abdominal/pelvic CT  Continue IV hydration for now  Avoid nephrotoxins  Renal dose meds  Trend creatinine       VTE prophylaxis: SCDs and ambulation

## 2021-06-21 NOTE — CARE PLAN
Problem: Knowledge Deficit - Standard  Goal: Patient and family/care givers will demonstrate understanding of plan of care, disease process/condition, diagnostic tests and medications  Outcome: Progressing     Problem: Fluid Volume  Goal: Fluid volume balance will be maintained  Outcome: Progressing   The patient is Stable - Low risk of patient condition declining or worsening    Shift Goals  Clinical Goals: ambuation  Patient Goals: ADLs    Progress made toward(s) clinical / shift goals:      Patient is not progressing towards the following goals:

## 2021-06-22 VITALS
RESPIRATION RATE: 16 BRPM | WEIGHT: 164.9 LBS | TEMPERATURE: 98.9 F | HEIGHT: 71 IN | SYSTOLIC BLOOD PRESSURE: 130 MMHG | DIASTOLIC BLOOD PRESSURE: 84 MMHG | BODY MASS INDEX: 23.09 KG/M2 | OXYGEN SATURATION: 95 % | HEART RATE: 65 BPM

## 2021-06-22 PROCEDURE — 700105 HCHG RX REV CODE 258: Performed by: STUDENT IN AN ORGANIZED HEALTH CARE EDUCATION/TRAINING PROGRAM

## 2021-06-22 PROCEDURE — 700111 HCHG RX REV CODE 636 W/ 250 OVERRIDE (IP): Performed by: STUDENT IN AN ORGANIZED HEALTH CARE EDUCATION/TRAINING PROGRAM

## 2021-06-22 PROCEDURE — 700111 HCHG RX REV CODE 636 W/ 250 OVERRIDE (IP): Performed by: INTERNAL MEDICINE

## 2021-06-22 RX ADMIN — CEFAZOLIN SODIUM 2 G: 2 INJECTION, SOLUTION INTRAVENOUS at 06:11

## 2021-06-22 RX ADMIN — DAPTOMYCIN 450 MG: 350 INJECTION, POWDER, LYOPHILIZED, FOR SOLUTION INTRAVENOUS at 16:00

## 2021-06-22 NOTE — CARE PLAN
Problem: Hemodynamics  Goal: Patient's hemodynamics, fluid balance and neurologic status will be stable or improve  Outcome: Progressing2     Problem: Fluid Volume  Goal: Fluid volume balance will be maintained  Outcome: Progressing     Problem: Respiratory  Goal: Patient will achieve/maintain optimum respiratory ventilation and gas exchange  Outcome: Progressing   The patient is Stable - Low risk of patient condition declining or worsening    Shift Goals  Clinical Goals: Hemodynamically stable  Patient Goals: rest    Progress made toward(s) clinical / shift goals:  Patient has patent Right nephrostomy tube. Denies complaints of pain. Remains hemodynamically stable at this time. Patient is resting comfortably in bed.     Patient is not progressing towards the following goals:       Dr. Vinicio Cevallos notified Jalen holt.      Néstor Gaston RN 7:04 PM

## 2021-06-22 NOTE — DISCHARGE SUMMARY
[facility] DISCHARGE SUMMARY     PATIENT ID:  Name:             Alex Lawson     YOB: 1940  Age:                 81 y.o.  male   MRN:               [id number]  Address:         [address] [place] [address]  Phone:            [phone] (home)    ADMISSION DATE:   6/14/2021    DISCHARGE DATE:   6/22/2021    DISCHARGE DIAGNOSES:   #Urosepsis   #MSSA bacteremia   #Pyelonephritis     ATTENDING PHYSICIAN:   Toy Gaitan MD     RESIDENT:   Robert Call MD     CONSULTANTS:    Urology   Interventional radiology   Infectious Disease     PROCEDURES:    IR placement of nephrostomy tube   PICC line placement for IV Abx     IMAGING:   IR-MIDLINE CATHETER INSERTION WO GUIDANCE > AGE 3   Final Result                  Ultrasound-guided midline placement performed by qualified nursing staff    as above.          EC-ECHOCARDIOGRAM COMPLETE W/O CONT   Final Result      US-RENAL   Final Result      Unremarkable renal ultrasound.      IR-PERQ NEPH CATH NEW ACCESS (ALL RADIOLOGY) RIGHT   Final Result      1. ULTRASOUND AND FLUOROSCOPIC GUIDED PLACEMENT OF A RIGHT 8 Italian  PIGTAIL LOCKING LOOP PERCUTANEOUS NEPHROSTOMY CATHETER.      2. NEPHROSTOGRAM SHOWING SATISFACTORY CATHETER POSITION WITHOUT EXTRAVASATION. MODERATE RIGHT HYDRONEPHROSIS AND MILD RIGHT HYDROURETER.      DX-CHEST-PORTABLE (1 VIEW)   Final Result         1.  No focal infiltrates.   2.  Perihilar interstitial prominence and bronchial wall cuffing, appearance suggests changes of underlying bronchial inflammation, consider bronchitis.      OUTSIDE IMAGES-CT ABDOMEN /PELVIS   Final Result      OUTSIDE IMAGES-DX CHEST   Final Result          PHYSICAL EXAM:   General: No acute distress, afebrile, resting comfortably  HEENT: NC/AT. EOMI.   Cardiovascular: RRR without murmurs. Normal capillary refill   Respiratory: CTAB, no tachypnea or retractions  Abdomen: soft, nontender, nondistended, no masses  EXT:  SCOTT, no edema  Skin:  No erythema/lesions   Neuro: Non-focal    LABS:    Results for CHARANJIT SANCHEZ (MRN [id number]) as of 6/22/2021 15:48   Ref. Range 6/16/2021 01:10 6/17/2021 00:45 6/18/2021 01:22   WBC Latest Ref Range: 4.8 - 10.8 K/uL 27.0 (H) 16.9 (H) 11.3 (H)   RBC Latest Ref Range: 4.70 - 6.10 M/uL 3.47 (L) 3.48 (L) 3.54 (L)   Hemoglobin Latest Ref Range: 14.0 - 18.0 g/dL 11.4 (L) 11.6 (L) 11.6 (L)   Hematocrit Latest Ref Range: 42.0 - 52.0 % 35.0 (L) 34.7 (L) 34.3 (L)   MCV Latest Ref Range: 81.4 - 97.8 fL 100.9 (H) 99.7 (H) 96.9   MCH Latest Ref Range: 27.0 - 33.0 pg 32.9 33.3 (H) 32.8   MCHC Latest Ref Range: 33.7 - 35.3 g/dL 32.6 (L) 33.4 (L) 33.8   RDW Latest Ref Range: 35.9 - 50.0 fL 47.6 45.2 43.2   Platelet Count Latest Ref Range: 164 - 446 K/uL 130 (L) 152 (L) 151 (L)   MPV Latest Ref Range: 9.0 - 12.9 fL 11.8 11.6 10.6     Results for CHARANJIT SANCHEZ (MRN [id number]) as of 6/22/2021 15:48   Ref. Range 6/17/2021 00:45 6/18/2021 01:22   Sodium Latest Ref Range: 135 - 145 mmol/L 136 138   Potassium Latest Ref Range: 3.6 - 5.5 mmol/L 3.9 3.6   Chloride Latest Ref Range: 96 - 112 mmol/L 104 104   Co2 Latest Ref Range: 20 - 33 mmol/L 24 24   Anion Gap Latest Ref Range: 7.0 - 16.0  8.0 10.0   Glucose Latest Ref Range: 65 - 99 mg/dL 113 (H) 110 (H)   Bun Latest Ref Range: 8 - 22 mg/dL 27 (H) 21   Creatinine Latest Ref Range: 0.50 - 1.40 mg/dL 1.42 (H) 1.07   GFR If  Latest Ref Range: >60 mL/min/1.73 m 2 58 (A) >60   GFR If Non  Latest Ref Range: >60 mL/min/1.73 m 2 48 (A) >60   Calcium Latest Ref Range: 8.5 - 10.5 mg/dL 8.5 8.2 (L)   Phosphorus Latest Ref Range: 2.5 - 4.5 mg/dL 3.4 3.6   Magnesium Latest Ref Range: 1.5 - 2.5 mg/dL 1.8 1.7       Results for CHARANJIT SANCHEZ (MRN [id number]) as of 6/22/2021 15:48   Ref. Range 6/14/2021 04:45   Procalcitonin Latest Ref Range: <0.25 ng/mL 135.07 (H)     Results for CHARANJIT SANCHEZ (MRN [id number]) as of 6/22/2021 15:48   Ref. Range 6/14/2021 04:45 6/14/2021  05:40 6/14/2021 05:40 6/16/2021 10:03   Significant Indicator Unknown NEG . POS (POS) NEG   Site Unknown - right kidney urine right kidney urine PERIPHERAL   Source Unknown UR BF BF BLD         HOSPITAL COURSE:     HPI taken from previous hospitalist notes:     81 y.o. male with a past medical history of BPH status post vasectomy years ago presented to the [facility] emergency department on 6/13/2021 with 1 day history of worsening right-sided back pain, fever and chills.  Patient stating that he has had pain similar to such a month ago that resolved on its own.  He does have a history of untreated UTIs.  Patient denies any urinary frequency, dysuria or straining on urination.  At the [facility] emergency department, Vital signs with tachycardia at 120, 39 °C fever, tachypnea in the 20s.  Patient requiring 4 L nasal cannula to maintain saturation.  CBC without leukocytosis or anemia.  Chemistry with acute renal failure creatinine 1.8 and BUN 36.  Lactic acidosis at 3.8. Blood and urine cultures collected.  He received a 2 L bolus and was started on Rocephin.     Patient was transferred to [facility] for sepsis secondary to pyonephritis. At the emergency department, vital signs with tachycardia in the 110s, tachypnea in the 20s, and hypotension with map at 63.  Patient was started on sepsis bolus and blood samples were collected for lab.  EDP as well as myself discussed case with Urology (Dr. Owens), who requested  tube placement by interventional radiology.  I discussed case with interventional radiology (Dr. Carvalho), who will evaluate case for nephrostomy tube placement indication.  Repeat CBC with leukocytosis at 17.1 with elevated polys and normocytic anemia with hemoglobin 12.6.  Patient was admitted to telemetry for sepsis secondary to pyelonephritis for IV abx.     Interval Updates taken from previous Hospitalists Notes:     6/15/2021: The patient was seen and evaluated at bedside and appears comfortable.  Patient underwent  placement of nephrostomy tube yesterday. Urology with current recommendations to continue nephrostomy tube as well as antibiotic therapy for at least 2 weeks prior to definitive intervention for kidney stone.  At this time we are pending final urine culture prior to discharge.     6/16/2021: Patient noted to have positive blood culture for MSSA.  Pending final cultures and sensitivities.  ID has been consulted and appreciate their recommendations.  Pending repeat blood cultures.  At this time patient remains comfortable.      6/17/2021: Patient continues to show interval improvement throughout the course of his stay.  Current recommendations from ID are to continue IV cefazolin for at least 4 weeks for MSSA bacteremia. Urology to evaluate patient for possible intervention on nephrolithiasis after 2 weeks of antibiotics.  Pending repeat blood cultures.  Transthoracic echo without any vegetations.      6/18/2021: Current ID recommendations are for ertapenem for prolonged course. At this time patient remains comfortable.  He is tolerating oral intake and ambulating independently.      6/19/2021: The patient was seen and evaluated at bedside and remains pleasant.  Patient is currently medically cleared for discharge once outpatient antibiotics have been arranged in his hometown in [place].     6/20/2021: Patient without acute changes. Patient is medically cleared for discharge once outpatient antibiotics have been arranged.     6/21/2021: The patient continues to do well.  He remains medically cleared for discharge.  [facility]     Care was transferred to the [facility] team on 6/22:     Interval events: Patient remains stable, no acute events overnight. Patient medically cleared for discharge with good outpatient follow up with his PCP and urology.     PCP to please follow up on:   1.  A PICC line was placed for the patient's prolonged  abx course. On discharge the patient will be administering ertapenem 1g daily until 7/14/2021. Please ensure this course is completed and monitor for symptoms   2.  Patient also noted to have nephrolithiasis. Please follow symptoms and ensure that patient follows up with urology after 2 weeks of antibiotics     DISCHARGE CONDITION:    Stable    DISPOSITION:   Home     DISCHARGE MEDICATIONS:      Medication List      You have not been prescribed any medications.         ACTIVITY:   Normal Activity as Tolerated.    DIET:   Healthy    DISCHARGE INSTRUCTIONS AND FOLLOW UP:  Patient is medically stable for discharge and will be discharged to home     Follow Up: PCP/Urology     Discharge Instructions:   Patient was instructed to return the ER in the event of worsening symptoms including but not limited to fevers, chills, flank pain, abdominal pain or any other major concerns. Patient understands that failure to do so may indicate worsening of his medical condition(s) and result in adverse clinical outcomes including fatality. We have counseled the patient on the importance of compliance and the patient has agreed to proceed with all medical recommendations and follow up plan indicated above. The patient understands that the failure to do so may result in result in adverse clinical outcomes including fatality.       CC:   Mateo Cho M.D.

## 2021-06-22 NOTE — DISCHARGE PLANNING
Anticipated Discharge Disposition: home with outpatient infusions    Action: RN CM received multiple calls from Sharon, patient's spouse, stating that there are problems getting home infusions set up due to not being able to get home health and [facility] in [place] has been difficult to get a hold of. Patient and spouse are now agreeable to get infusions done outpatient at [facility]. RENNY MONTALVO [facility] will work on arranging infusions at [place] [facility]. RN CM faxed requested documents to [place] [facility] [facility] for infusions at [phone].    Barriers to Discharge: infusions set up outpatient    Plan: Case coordination to f/u with [place] [facility] and [facility]    1025-RENNY MONTALVO spoke with Elyse, pharmacist, at [facility] and she received all the necessary information for patient's infusions. She was informed that patient very anxious to discharge home today if able to. She will updated this RN CM after review of information.    1500-RN CM called and spoke with Elyse at [facility] and they have scheduled patient for 1500 Wednesday for his first infusion. RN CM added information to AVS. He will then go daily until course is finished. RENNY MONTALVO updated Dr. Solorio and BS Jamal LAWSON, who will be administering a dose of daptomycin before patient leaves. RN CM called and updated patient's spouse, Sharon, who reports that the ride is no longer able to get patient today and they cannot afford a ride that distance. RENNY MONTALVO discussed with Mili, supervisor, who signed approved services for an uber ride, $150, home so patient does not have to stay another night. RENNY MONTALVO confirmed with Sharon that address on facesheet is correct. BS RNJamal, to call [phone] when patient is ready to leave and heading down to [facility]. The SW will then call for uber ride to arrive.

## 2021-06-22 NOTE — PROGRESS NOTES
Assumed care at 1900, bedside report received from Kristina LAWSON. Pt. Is SR on the monitor. Initial assessment completed, orders reviewed, call light within reach, bed alarm not in use, and hourly rounding in place. POC addressed with patient, no additional questions at this time.

## 2021-06-22 NOTE — DISCHARGE INSTRUCTIONS
Discharge Instructions    Discharged to home by car with relative. Discharged via wheelchair, hospital escort: Yes.  Special equipment needed: Not Applicable    Be sure to schedule a follow-up appointment with your primary care doctor or any specialists as instructed.     Discharge Plan:        I understand that a diet low in cholesterol, fat, and sodium is recommended for good health. Unless I have been given specific instructions below for another diet, I accept this instruction as my diet prescription.   Other diet: as tolerated    Special Instructions:   MIdline care and instructions provided  Daily antibiotics outpatient infusion arranged  Nephrostomy tube care and instructions provided  Follow up with MD    · Is patient discharged on Warfarin / Coumadin?   No     Depression / Suicide Risk    As you are discharged from this [facility] facility, it is important to learn how to keep safe from harming yourself.    Recognize the warning signs:  · Abrupt changes in personality, positive or negative- including increase in energy   · Giving away possessions  · Change in eating patterns- significant weight changes-  positive or negative  · Change in sleeping patterns- unable to sleep or sleeping all the time   · Unwillingness or inability to communicate  · Depression  · Unusual sadness, discouragement and loneliness  · Talk of wanting to die  · Neglect of personal appearance   · Rebelliousness- reckless behavior  · Withdrawal from people/activities they love  · Confusion- inability to concentrate     If you or a loved one observes any of these behaviors or has concerns about self-harm, here's what you can do:  · Talk about it- your feelings and reasons for harming yourself  · Remove any means that you might use to hurt yourself (examples: pills, rope, extension cords, firearm)  · Get professional help from the community (Mental Health, Substance Abuse, psychological counseling)  · Do not be alone:Call your Safe  Contact- someone whom you trust who will be there for you.  · Call your local CRISIS HOTLINE 857-6980 or 345-707-1617  · Call your local Children's Mobile Crisis Response Team [place] (370) 298-8462 or www.Doyenz  · Call the toll free National Suicide Prevention Hotlines   · National Suicide Prevention Lifeline 636-281-JHMI (4000)  · National Hope Line Network 800-SUICIDE (089-5621)      Percutaneous Nephrostomy, Care After  This sheet gives you information about how to care for yourself after your procedure. Your health care provider may also give you more specific instructions. If you have problems or questions, contact your health care provider.  What can I expect after the procedure?  After the procedure, it is common to have:  · Some soreness where the nephrostomy tube was inserted (tube insertion site).  · Blood-tinged drainage from the nephrostomy tube for the first 24 hours.  Follow these instructions at home:  Activity  · Return to your normal activities as told by your health care provider. Ask your health care provider what activities are safe for you.  · Avoid activities that may cause the nephrostomy tubing to bend.  · Do not take baths, swim, or use a hot tub until your health care provider approves. Ask your health care provider if you can take showers. Cover the nephrostomy tube dressing with a watertight covering when you take a shower.  · Do not drive for 24 hours if you were given a medicine to help you relax (sedative).  Care of the tube insertion site    · Follow instructions from your health care provider about how to take care of your tube insertion site. Make sure you:  ? Wash your hands with soap and water before you change your bandage (dressing). If soap and water are not available, use hand .  ? Change your dressing as told by your health care provider. Be careful not to pull on the tube while removing the dressing.  ? When you change the dressing, wash the skin  around the tube, rinse well, and pat the skin dry.  · Check the tube insertion area every day for signs of infection. Check for:  ? More redness, swelling, or pain.  ? More fluid or blood.  ? Warmth.  ? Pus or a bad smell.  Care of the nephrostomy tube and drainage bag  · Always keep the tubing, the leg bag, or the bedside drainage bags below the level of the kidney so that your urine drains freely.  · When connecting your nephrostomy tube to a drainage bag, make sure that there are no kinks in the tubing and that your urine is draining freely. You may want to use an elastic bandage to wrap any exposed tubing that goes from the nephrostomy tube to any of the connecting tubes.  · At night, you may want to connect your nephrostomy tube or the leg bag to a larger bedside drainage bag.  · Follow instructions from your health care provider about how to empty or change the drainage bag.  · Empty the drainage bag when it becomes ? full.  · Replace the drainage bag and any extension tubing that is connected to your nephrostomy tube every 3 weeks or as often as told by your health care provider. Your health care provider will explain how to change the drainage bag and extension tubing.  General instructions  · Take over-the-counter and prescription medicines only as told by your health care provider.  · Keep all follow-up visits as told by your health care provider. This is important.  Contact a health care provider if:  · You have problems with any of the valves or tubing.  · You have persistent pain or soreness in your back.  · You have more redness, swelling, or pain around your tube insertion site.  · You have more fluid or blood coming from your tube insertion site.  · Your tube insertion site feels warm to the touch.  · You have pus or a bad smell coming from your tube insertion site.  · You have increased urine output or you feel burning when urinating.  Get help right away if:  · You have pain in your abdomen during  the first week.  · You have chest pain or have trouble breathing.  · You have a new appearance of blood in your urine.  · You have a fever or chills.  · You have back pain that is not relieved by your medicine.  · You have decreased urine output.  · Your nephrostomy tube comes out.  This information is not intended to replace advice given to you by your health care provider. Make sure you discuss any questions you have with your health care provider.  Document Released: 08/10/2005 Document Revised: 11/30/2018 Document Reviewed: 09/29/2017  Elsevier Patient Education © 2020 [employer]       none

## 2021-06-22 NOTE — CARE PLAN
Problem: Infection - Standard  Goal: Patient will remain free from infection  Outcome: Progressing  Note: Antibiotics as ordered     Problem: Knowledge Deficit - Standard  Goal: Patient and family/care givers will demonstrate understanding of plan of care, disease process/condition, diagnostic tests and medications  Note: Neph tube care provided   The patient is Watcher - Medium risk of patient condition declining or worsening    Shift Goals  Clinical Goals: Hemodynamically stable  Patient Goals: rest    Progress made toward(s) clinical / shift goals:  will get oob as planned    Patient is not progressing towards the following goals:

## 2021-06-22 NOTE — PROGRESS NOTES
Received in bed, aaox4, denies any discomfort, neph tube dressing CDI to DD with michael MICHELLE, POC discussed, awaiting home infusion set up.